# Patient Record
Sex: FEMALE | Race: WHITE | Employment: OTHER | URBAN - METROPOLITAN AREA
[De-identification: names, ages, dates, MRNs, and addresses within clinical notes are randomized per-mention and may not be internally consistent; named-entity substitution may affect disease eponyms.]

---

## 2018-04-30 PROBLEM — H81.10 BPPV (BENIGN PAROXYSMAL POSITIONAL VERTIGO): Status: ACTIVE | Noted: 2018-04-30

## 2018-05-01 ENCOUNTER — OFFICE VISIT (OUTPATIENT)
Dept: FAMILY MEDICINE CLINIC | Age: 38
End: 2018-05-01

## 2018-05-01 VITALS
DIASTOLIC BLOOD PRESSURE: 66 MMHG | WEIGHT: 123 LBS | OXYGEN SATURATION: 98 % | SYSTOLIC BLOOD PRESSURE: 90 MMHG | RESPIRATION RATE: 12 BRPM | HEART RATE: 84 BPM | HEIGHT: 65 IN | BODY MASS INDEX: 20.49 KG/M2

## 2018-05-01 DIAGNOSIS — H81.10 BENIGN PAROXYSMAL POSITIONAL VERTIGO, UNSPECIFIED LATERALITY: ICD-10-CM

## 2018-05-01 DIAGNOSIS — Z86.2 HISTORY OF ANEMIA: ICD-10-CM

## 2018-05-01 DIAGNOSIS — Z00.01 ENCOUNTER FOR GENERAL ADULT MEDICAL EXAMINATION WITH ABNORMAL FINDINGS: Primary | ICD-10-CM

## 2018-05-01 DIAGNOSIS — Z86.32 HISTORY OF GESTATIONAL DIABETES: ICD-10-CM

## 2018-05-01 DIAGNOSIS — F39 MOOD DISORDER (HCC): ICD-10-CM

## 2018-05-01 DIAGNOSIS — R11.0 NAUSEA: ICD-10-CM

## 2018-05-01 LAB
ALBUMIN SERPL-MCNC: 4.7 G/DL (ref 3.4–5)
ALP BLD-CCNC: 29 U/L (ref 40–129)
ALT SERPL-CCNC: 7 U/L (ref 10–40)
AMYLASE: 54 U/L (ref 25–115)
ANION GAP SERPL CALCULATED.3IONS-SCNC: 12 MMOL/L (ref 3–16)
AST SERPL-CCNC: 10 U/L (ref 15–37)
BASOPHILS ABSOLUTE: 0 K/UL (ref 0–0.2)
BASOPHILS RELATIVE PERCENT: 0.7 %
BILIRUB SERPL-MCNC: 0.5 MG/DL (ref 0–1)
BILIRUBIN DIRECT: <0.2 MG/DL (ref 0–0.3)
BILIRUBIN, INDIRECT: ABNORMAL MG/DL (ref 0–1)
BUN BLDV-MCNC: 12 MG/DL (ref 7–20)
CALCIUM SERPL-MCNC: 9.2 MG/DL (ref 8.3–10.6)
CHLORIDE BLD-SCNC: 104 MMOL/L (ref 99–110)
CO2: 25 MMOL/L (ref 21–32)
CREAT SERPL-MCNC: 0.6 MG/DL (ref 0.6–1.1)
EOSINOPHILS ABSOLUTE: 0 K/UL (ref 0–0.6)
EOSINOPHILS RELATIVE PERCENT: 0.6 %
GFR AFRICAN AMERICAN: >60
GFR NON-AFRICAN AMERICAN: >60
GLUCOSE BLD-MCNC: 95 MG/DL (ref 70–99)
HCT VFR BLD CALC: 36.3 % (ref 36–48)
HEMOGLOBIN: 12.1 G/DL (ref 12–16)
LYMPHOCYTES ABSOLUTE: 1.5 K/UL (ref 1–5.1)
LYMPHOCYTES RELATIVE PERCENT: 34.6 %
MCH RBC QN AUTO: 30.9 PG (ref 26–34)
MCHC RBC AUTO-ENTMCNC: 33.2 G/DL (ref 31–36)
MCV RBC AUTO: 93 FL (ref 80–100)
MONOCYTES ABSOLUTE: 0.3 K/UL (ref 0–1.3)
MONOCYTES RELATIVE PERCENT: 7.5 %
NEUTROPHILS ABSOLUTE: 2.5 K/UL (ref 1.7–7.7)
NEUTROPHILS RELATIVE PERCENT: 56.6 %
PDW BLD-RTO: 13.2 % (ref 12.4–15.4)
PLATELET # BLD: 159 K/UL (ref 135–450)
PMV BLD AUTO: 11 FL (ref 5–10.5)
POTASSIUM SERPL-SCNC: 4.1 MMOL/L (ref 3.5–5.1)
RBC # BLD: 3.9 M/UL (ref 4–5.2)
SEDIMENTATION RATE, ERYTHROCYTE: 9 MM/HR (ref 0–20)
SODIUM BLD-SCNC: 141 MMOL/L (ref 136–145)
TOTAL PROTEIN: 6.9 G/DL (ref 6.4–8.2)
TSH REFLEX FT4: 1.14 UIU/ML (ref 0.27–4.2)
WBC # BLD: 4.5 K/UL (ref 4–11)

## 2018-05-01 PROCEDURE — 99204 OFFICE O/P NEW MOD 45 MIN: CPT | Performed by: INTERNAL MEDICINE

## 2018-05-01 PROCEDURE — 36415 COLL VENOUS BLD VENIPUNCTURE: CPT | Performed by: INTERNAL MEDICINE

## 2018-05-01 RX ORDER — ALPRAZOLAM 0.25 MG/1
0.25 TABLET ORAL NIGHTLY PRN
COMMUNITY
End: 2019-05-09 | Stop reason: SDUPTHER

## 2018-05-01 RX ORDER — ESCITALOPRAM OXALATE 10 MG/1
TABLET ORAL
Qty: 10 TABLET | Refills: 0 | Status: SHIPPED | OUTPATIENT
Start: 2018-05-01 | End: 2018-05-17 | Stop reason: SDUPTHER

## 2018-05-01 ASSESSMENT — PATIENT HEALTH QUESTIONNAIRE - PHQ9
SUM OF ALL RESPONSES TO PHQ9 QUESTIONS 1 & 2: 0
2. FEELING DOWN, DEPRESSED OR HOPELESS: 0
SUM OF ALL RESPONSES TO PHQ QUESTIONS 1-9: 0
1. LITTLE INTEREST OR PLEASURE IN DOING THINGS: 0

## 2018-05-02 PROBLEM — R10.13 DYSPEPSIA: Status: ACTIVE | Noted: 2018-05-02

## 2018-05-02 PROBLEM — F41.9 ANXIETY: Status: ACTIVE | Noted: 2018-05-02

## 2018-05-02 LAB
ESTIMATED AVERAGE GLUCOSE: 108.3 MG/DL
HBA1C MFR BLD: 5.4 %

## 2018-05-17 ENCOUNTER — OFFICE VISIT (OUTPATIENT)
Dept: FAMILY MEDICINE CLINIC | Age: 38
End: 2018-05-17

## 2018-05-17 VITALS
DIASTOLIC BLOOD PRESSURE: 56 MMHG | OXYGEN SATURATION: 98 % | HEART RATE: 68 BPM | RESPIRATION RATE: 12 BRPM | WEIGHT: 121 LBS | BODY MASS INDEX: 20.16 KG/M2 | SYSTOLIC BLOOD PRESSURE: 90 MMHG | HEIGHT: 65 IN

## 2018-05-17 DIAGNOSIS — F41.9 ANXIETY: Primary | ICD-10-CM

## 2018-05-17 DIAGNOSIS — R11.0 NAUSEA: ICD-10-CM

## 2018-05-17 DIAGNOSIS — R10.13 DYSPEPSIA: ICD-10-CM

## 2018-05-17 DIAGNOSIS — H81.10 BENIGN PAROXYSMAL POSITIONAL VERTIGO, UNSPECIFIED LATERALITY: ICD-10-CM

## 2018-05-17 PROCEDURE — 99213 OFFICE O/P EST LOW 20 MIN: CPT | Performed by: INTERNAL MEDICINE

## 2018-05-17 RX ORDER — FLUTICASONE PROPIONATE 50 MCG
1 SPRAY, SUSPENSION (ML) NASAL DAILY
COMMUNITY
End: 2022-03-29

## 2018-05-17 RX ORDER — ESCITALOPRAM OXALATE 10 MG/1
TABLET ORAL
Qty: 30 TABLET | Refills: 1 | Status: SHIPPED | OUTPATIENT
Start: 2018-05-17 | End: 2018-06-21 | Stop reason: SDUPTHER

## 2018-06-21 ENCOUNTER — OFFICE VISIT (OUTPATIENT)
Dept: FAMILY MEDICINE CLINIC | Age: 38
End: 2018-06-21

## 2018-06-21 VITALS
WEIGHT: 118 LBS | DIASTOLIC BLOOD PRESSURE: 59 MMHG | OXYGEN SATURATION: 98 % | HEART RATE: 70 BPM | SYSTOLIC BLOOD PRESSURE: 92 MMHG | BODY MASS INDEX: 19.66 KG/M2 | HEIGHT: 65 IN

## 2018-06-21 DIAGNOSIS — L60.9 NAIL ABNORMALITIES: ICD-10-CM

## 2018-06-21 DIAGNOSIS — R10.13 DYSPEPSIA: ICD-10-CM

## 2018-06-21 DIAGNOSIS — H81.10 BENIGN PAROXYSMAL POSITIONAL VERTIGO, UNSPECIFIED LATERALITY: ICD-10-CM

## 2018-06-21 DIAGNOSIS — F41.9 ANXIETY: Primary | ICD-10-CM

## 2018-06-21 PROCEDURE — 99213 OFFICE O/P EST LOW 20 MIN: CPT | Performed by: INTERNAL MEDICINE

## 2018-06-21 RX ORDER — ESCITALOPRAM OXALATE 10 MG/1
TABLET ORAL
Qty: 90 TABLET | Refills: 0 | Status: SHIPPED | OUTPATIENT
Start: 2018-06-21 | End: 2018-09-20 | Stop reason: SDUPTHER

## 2018-07-26 ENCOUNTER — OFFICE VISIT (OUTPATIENT)
Dept: PSYCHOLOGY | Age: 38
End: 2018-07-26

## 2018-07-26 DIAGNOSIS — F41.1 GAD (GENERALIZED ANXIETY DISORDER): Primary | ICD-10-CM

## 2018-07-26 PROCEDURE — 90791 PSYCH DIAGNOSTIC EVALUATION: CPT | Performed by: PSYCHOLOGIST

## 2018-07-26 ASSESSMENT — ANXIETY QUESTIONNAIRES
2. NOT BEING ABLE TO STOP OR CONTROL WORRYING: 0-NOT AT ALL SURE
1. FEELING NERVOUS, ANXIOUS, OR ON EDGE: 1-SEVERAL DAYS
4. TROUBLE RELAXING: 1-SEVERAL DAYS
GAD7 TOTAL SCORE: 3
6. BECOMING EASILY ANNOYED OR IRRITABLE: 0-NOT AT ALL SURE
7. FEELING AFRAID AS IF SOMETHING AWFUL MIGHT HAPPEN: 0-NOT AT ALL SURE
3. WORRYING TOO MUCH ABOUT DIFFERENT THINGS: 0-NOT AT ALL SURE
5. BEING SO RESTLESS THAT IT IS HARD TO SIT STILL: 1-SEVERAL DAYS

## 2018-07-26 ASSESSMENT — PATIENT HEALTH QUESTIONNAIRE - PHQ9
1. LITTLE INTEREST OR PLEASURE IN DOING THINGS: 1
SUM OF ALL RESPONSES TO PHQ9 QUESTIONS 1 & 2: 2
2. FEELING DOWN, DEPRESSED OR HOPELESS: 1
SUM OF ALL RESPONSES TO PHQ QUESTIONS 1-9: 2

## 2018-07-26 NOTE — PROGRESS NOTES
 Smoking status: Never Smoker    Smokeless tobacco: Never Used    Alcohol use No    Drug use: Unknown    Sexual activity: Yes     Other Topics Concern    Not on file     Social History Narrative    No narrative on file       TOBACCO:   reports that she has never smoked. She has never used smokeless tobacco.  ETOH:   reports that she does not drink alcohol. Family History:   No family history on file. A:    See S: above. Pt presenting with chronic anxiety disorder in the ISAURA and panic spectrum. Multiple transitions:  just bought chiropractic practice, pt doing the financial books for it. Provided psycho-ed about role transition in her family playing in increased anxiety focused on panic attack management skills today. She will rehearse breathing exercises to use if another panic attack happens in the future. Rest of appt discussed the benefits of longer term therapy referral but she would like to put on hold for now. Pt has a lot of solid therapeutic lifestyle changes (TLCs)  in place in addition to medication. Would like to try TLCs a few more months before makes decision for longer term therapy referral and I am in agreement with this. I will continue to bridge services until she makes decision. Denied any si/hi risk, intent, or plan. F/u with me in 3-4 weeks. PHQ Scores 7/26/2018 5/1/2018   PHQ2 Score 2 0   PHQ9 Score 2 0     Interpretation of Total Score Depression Severity: 1-4 = Minimal depression, 5-9 = Mild depression, 10-14 = Moderate depression, 15-19 = Moderately severe depression, 20-27 = Severe depression    ISAURA 7 SCORE 7/26/2018   ISAURA-7 Total Score 3     Interpretation of ISAURA-7 score: 5-9 = mild anxiety, 10-14 = moderate anxiety, 15+ = severe anxiety. Recommend referral to behavioral health for scores 10 or greater.   Diagnosis:    ISAURA, Panic disorder       Diagnosis Date    Anxiety     BPPV (benign paroxysmal positional vertigo) 4/30/2018    GERD (gastroesophageal

## 2018-07-26 NOTE — PATIENT INSTRUCTIONS
1. Read panic attack handout: practice breathing exercises, [id number]   2. Schedule , 1 x per week, 1-2 hours   3. Continue to yoga and pilates, nearly every day 30-40 minutes  4. Continue to take Lexapro 10 mg daily  5. Think about longer term therapy down the road  6. Return to clinic for Dr. Latanya Mcqueen in 3-4 weeks             Panic Disorder Handout    What Is a Panic Attack? Sometimes we experience a sudden and severe onset of symptoms that can be scary. These symptoms can include some or all of the following:     Pounding heart or increased heart rate   Feeling dizzy, unsteady, lightheaded, or faint   Sweating   Nausea   Feelings of unreality or being detached from yourself   Trembling or shaking   Fear of losing control or going crazy   Shortness of breath   Fear of dying   Feeling of choking   Numbness or tingling   Chest pain   Chills or hot flashes    Although we dont fully understand why some people experience panic attacks and other people dont, we do know that these symptoms are related to a very normal response called the fight-flight response. This response allows our body to react quickly when we think that something is dangerous, such as being attacked or being cut off when we are driving. How Panic Attacks Affect Our Lives    Because symptoms of a panic attack occur out of the blue, we can become worried about them, and we may begin to avoid situations that we think will result in these panic symptoms, such as crowded stores, public transportation, or driving. What situations have you avoided because of panic attacks? Changing Thinking Patterns    One of the most important changes associated with decreasing panic attacks is changing how we think. The fear associated with having a panic attack may increase the likelihood of having an attack. Therefore, a willingness to experience a panic attack, knowing that the symptoms, although uncomfortable, will not harm you, is an important aspect of managing the symptoms of panic. Thinking that increases panic          Thinking that decreases panic  Im having a heart attack! This is not an emergency. Im going to die. This doesnt feel good, it wont hurt me. I cant stand this. I can feel uncomfortable and still be OK. I have to get out of here. This will go away with time. Oh no, here it comes! I can handle this. What are the things that you say to yourself that may increase panic symptoms? What could you say to decrease panic symptoms? Breathing Retraining    People who have panic attacks show some signs of hyperventilation or overbreathing. When people hyperventilate, certain blood vessels in the body become narrower, which can contribute to numbness or tingling in the hands or feet or the sensation of cold, clammy hands, and increased heart rate. You can help overcome overbreathing by learning breathing control. Instructions for Breathing Retrainin. Choose a comfortable quiet location. 2. Count, One, on the first breath in, and think, Relax, on the breath out. 3. Focus your attention on breathing and counting. 4. Maintain a normal rate and depth of breathing. 5. Expand your abdomen on the breath in and keep your chest still.   6. Continue counting up to 10 and back to 1.  7. Practice two times

## 2018-08-30 ENCOUNTER — OFFICE VISIT (OUTPATIENT)
Dept: PSYCHOLOGY | Age: 38
End: 2018-08-30

## 2018-08-30 DIAGNOSIS — F41.0 PANIC DISORDER: ICD-10-CM

## 2018-08-30 DIAGNOSIS — F41.1 GAD (GENERALIZED ANXIETY DISORDER): Primary | ICD-10-CM

## 2018-08-30 PROCEDURE — 90834 PSYTX W PT 45 MINUTES: CPT | Performed by: PSYCHOLOGIST

## 2018-08-30 ASSESSMENT — ANXIETY QUESTIONNAIRES
3. WORRYING TOO MUCH ABOUT DIFFERENT THINGS: 0-NOT AT ALL SURE
1. FEELING NERVOUS, ANXIOUS, OR ON EDGE: 1-SEVERAL DAYS
7. FEELING AFRAID AS IF SOMETHING AWFUL MIGHT HAPPEN: 1-SEVERAL DAYS
4. TROUBLE RELAXING: 0-NOT AT ALL SURE
6. BECOMING EASILY ANNOYED OR IRRITABLE: 1-SEVERAL DAYS
GAD7 TOTAL SCORE: 4
2. NOT BEING ABLE TO STOP OR CONTROL WORRYING: 1-SEVERAL DAYS
5. BEING SO RESTLESS THAT IT IS HARD TO SIT STILL: 0-NOT AT ALL SURE

## 2018-08-30 ASSESSMENT — PATIENT HEALTH QUESTIONNAIRE - PHQ9
SUM OF ALL RESPONSES TO PHQ QUESTIONS 1-9: 1
1. LITTLE INTEREST OR PLEASURE IN DOING THINGS: 1
SUM OF ALL RESPONSES TO PHQ QUESTIONS 1-9: 1
SUM OF ALL RESPONSES TO PHQ9 QUESTIONS 1 & 2: 1
2. FEELING DOWN, DEPRESSED OR HOPELESS: 0

## 2018-08-30 NOTE — PROGRESS NOTES
depression, 20-27 = Severe depression    ISAURA 7 SCORE 8/30/2018 7/26/2018   ISAURA-7 Total Score 4 3     Interpretation of ISAURA-7 score: 5-9 = mild anxiety, 10-14 = moderate anxiety, 15+ = severe anxiety. Recommend referral to behavioral health for scores 10 or greater. Diagnosis:    ISAURA, Panic disorder       Diagnosis Date    Anxiety     BPPV (benign paroxysmal positional vertigo) 4/30/2018    GERD (gastroesophageal reflux disease)     Gestational diabetes      Problems related to the social environment    Plan:  Pt interventions:    Practiced assertive communication, Trained in strategies for increasing balanced thinking, Discussed self-care (sleep, nutrition, rewarding activities, social support, exercise), Motivational Interviewing to increase patient confidence and compliance with adhering to behavioral change plan, Discussed potential barriers to change and Supportive techniques    Pt Behavioral Change Plan:    1. Schedule time for self 1 x per week, ask mother to care for children  2. Ask , Adiel Nelson to take children Thursday mornings for down time, 2 hours to do something for yourself  3. Continue to use breathing exercises to control panic  4.  Return to clinic for Dr. Serg Mendoza as needed

## 2018-09-20 ENCOUNTER — OFFICE VISIT (OUTPATIENT)
Dept: FAMILY MEDICINE CLINIC | Age: 38
End: 2018-09-20

## 2018-09-20 VITALS
DIASTOLIC BLOOD PRESSURE: 60 MMHG | BODY MASS INDEX: 19.66 KG/M2 | WEIGHT: 118 LBS | RESPIRATION RATE: 12 BRPM | OXYGEN SATURATION: 98 % | HEIGHT: 65 IN | SYSTOLIC BLOOD PRESSURE: 96 MMHG | HEART RATE: 71 BPM

## 2018-09-20 DIAGNOSIS — F41.9 ANXIETY: Primary | ICD-10-CM

## 2018-09-20 DIAGNOSIS — H81.10 BENIGN PAROXYSMAL POSITIONAL VERTIGO, UNSPECIFIED LATERALITY: ICD-10-CM

## 2018-09-20 DIAGNOSIS — L60.9 NAIL ABNORMALITY: ICD-10-CM

## 2018-09-20 PROCEDURE — 99213 OFFICE O/P EST LOW 20 MIN: CPT | Performed by: INTERNAL MEDICINE

## 2018-09-20 RX ORDER — ESCITALOPRAM OXALATE 20 MG/1
TABLET ORAL
Qty: 90 TABLET | Refills: 0 | Status: SHIPPED | OUTPATIENT
Start: 2018-09-20 | End: 2018-10-18 | Stop reason: SDUPTHER

## 2018-09-20 NOTE — PROGRESS NOTES
HPI: Braden Hyatt presents for follow up of anxiety    History of anxiety as a child. Therapy in college. Noted on Lexapro with good response 2018. Has had some follow-up with psychology however financial recent restrictions. Increased stressors at home. Some difficulty sleeping. Decreased appetite. No chest pain or panic attack since July. Not dancing now secondary to daughter having dance at the same time. Is taking a couple of hours off a week while her mother watches the children is able to do journaling. Is doing some crafts. No recreational drugs. Family issues with her . Starting a new business. Increased stressors. Not looking forward to winter. Has to interact more in  and is having some increased anxiety depression. No suicidal thought. Dyspepsia improved. Normal laboratories.        no abnormal pap. GYN not under plans. Pap 2018. .      BPPV. MRI negative. Occur every 2 months. Has home exercises which she does to resolve this. This is stable.     PMH     x 2 gestational diabetes.     MEDS: pnv Lexapro Penlac, Flonase,     SH:  2 children. 7 yo with anxiety. No tobacco. No alcohol. No drugs. No domestic violence. 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     FH: 4 siblings    - breast ovarian, colon cancer, DM     + alcoholism, anxiety and depression.      ROS: Up-to-date eye exams. No hearing loss. Recurrent vertigo. Dyspepsia. History of bronchitis remotely. Occasional ventricular ectopy. Had Holter and EKG with echocardiogram without abnormality. Not as much of an issue currently. Denies vomiting. Positive constipation during her menses. No recurrent bladder infections. No joint complaints.  No skin concerns other than dry hands        Constitutional, ent, CV, respiratory, GI, , joint, skin, allergic and psychiatric ROS negative except for above    No Known Allergies    Outpatient Prescriptions Marked as Taking for the 18 encounter (Office Date/Time     05/01/2018 1109    K 4.1 05/01/2018 1109     05/01/2018 1109    CO2 25 05/01/2018 1109    BUN 12 05/01/2018 1109    CREATININE 0.6 05/01/2018 1109        Component Value Date/Time    CALCIUM 9.2 05/01/2018 1109    ALKPHOS 29 (L) 05/01/2018 1109    AST 10 (L) 05/01/2018 1109    ALT 7 (L) 05/01/2018 1109    BILITOT 0.5 05/01/2018 1109            Lab Results   Component Value Date    WBC 4.5 05/01/2018    HGB 12.1 05/01/2018    HCT 36.3 05/01/2018    MCV 93.0 05/01/2018     05/01/2018     Lab Results   Component Value Date    LABA1C 5.4 05/01/2018     Lab Results   Component Value Date    .3 05/01/2018     Lab Results   Component Value Date    LABA1C 5.4 05/01/2018     No components found for: CHLPL  No results found for: TRIG  No results found for: HDL  No results found for: LDLCALC  No results found for: LABVLDL    Old labs and records reviewed or requested  Discussed past lab and studies with patient      Diagnosis Orders   1. Anxiety     2. Benign paroxysmal positional vertigo, unspecified laterality     3. Nail abnormality       Anxiety and depression worsening with financial stressors, new business and 's family issues. Will increase Lexapro to 20 mg. Follow back up in 6 weeks. BP PV. Stable. Continue on with her exercise    Disc more thick nail. Continue on with her lack or      Return in about 5 weeks (around 10/25/2018), or Dr May Brown or me. Let her know if she wanted to follow-up with Dr. Rakel Toribio we will talk with each other and she could skip appointment with me and perhaps this would be financially feasible for her        Diagnosis and treatment discussed.   Possible side effects of medication reviewed  Patients questions answered  Follow up understood  Pt aware if they are not contacted about any test results , this does not mean they are normal.  They should call

## 2018-10-05 RX ORDER — ESCITALOPRAM OXALATE 10 MG/1
TABLET ORAL
Qty: 90 TABLET | Refills: 0 | OUTPATIENT
Start: 2018-10-05

## 2018-10-18 ENCOUNTER — OFFICE VISIT (OUTPATIENT)
Dept: FAMILY MEDICINE CLINIC | Age: 38
End: 2018-10-18
Payer: COMMERCIAL

## 2018-10-18 VITALS
HEART RATE: 69 BPM | DIASTOLIC BLOOD PRESSURE: 62 MMHG | WEIGHT: 121 LBS | HEIGHT: 65 IN | OXYGEN SATURATION: 97 % | RESPIRATION RATE: 12 BRPM | SYSTOLIC BLOOD PRESSURE: 90 MMHG | BODY MASS INDEX: 20.16 KG/M2

## 2018-10-18 DIAGNOSIS — F41.9 ANXIETY: Primary | ICD-10-CM

## 2018-10-18 PROCEDURE — 99201 PR OFFICE OUTPATIENT NEW 10 MINUTES: CPT | Performed by: INTERNAL MEDICINE

## 2018-10-18 RX ORDER — ESCITALOPRAM OXALATE 20 MG/1
TABLET ORAL
Qty: 90 TABLET | Refills: 0 | Status: SHIPPED | OUTPATIENT
Start: 2018-10-18 | End: 2019-01-17 | Stop reason: SDUPTHER

## 2019-01-17 RX ORDER — ESCITALOPRAM OXALATE 20 MG/1
TABLET ORAL
Qty: 90 TABLET | Refills: 0 | Status: SHIPPED | OUTPATIENT
Start: 2019-01-17 | End: 2019-04-29 | Stop reason: SDUPTHER

## 2019-05-09 ENCOUNTER — OFFICE VISIT (OUTPATIENT)
Dept: FAMILY MEDICINE CLINIC | Age: 39
End: 2019-05-09
Payer: COMMERCIAL

## 2019-05-09 VITALS
DIASTOLIC BLOOD PRESSURE: 63 MMHG | BODY MASS INDEX: 20.99 KG/M2 | RESPIRATION RATE: 12 BRPM | OXYGEN SATURATION: 97 % | SYSTOLIC BLOOD PRESSURE: 95 MMHG | HEART RATE: 72 BPM | HEIGHT: 65 IN | WEIGHT: 126 LBS

## 2019-05-09 DIAGNOSIS — F41.9 ANXIETY: Primary | ICD-10-CM

## 2019-05-09 DIAGNOSIS — J30.2 SEASONAL ALLERGIC RHINITIS, UNSPECIFIED TRIGGER: ICD-10-CM

## 2019-05-09 DIAGNOSIS — H81.10 BENIGN PAROXYSMAL POSITIONAL VERTIGO, UNSPECIFIED LATERALITY: ICD-10-CM

## 2019-05-09 PROCEDURE — G8427 DOCREV CUR MEDS BY ELIG CLIN: HCPCS | Performed by: INTERNAL MEDICINE

## 2019-05-09 PROCEDURE — 99213 OFFICE O/P EST LOW 20 MIN: CPT | Performed by: INTERNAL MEDICINE

## 2019-05-09 PROCEDURE — 1036F TOBACCO NON-USER: CPT | Performed by: INTERNAL MEDICINE

## 2019-05-09 PROCEDURE — G8420 CALC BMI NORM PARAMETERS: HCPCS | Performed by: INTERNAL MEDICINE

## 2019-05-09 RX ORDER — ESCITALOPRAM OXALATE 20 MG/1
TABLET ORAL
Qty: 90 TABLET | Refills: 1 | Status: SHIPPED | OUTPATIENT
Start: 2019-05-09 | End: 2020-01-22 | Stop reason: SDUPTHER

## 2019-05-09 RX ORDER — ALPRAZOLAM 0.25 MG/1
TABLET ORAL
Qty: 10 TABLET | Refills: 0 | Status: SHIPPED | OUTPATIENT
Start: 2019-05-09 | End: 2020-05-09

## 2019-05-09 ASSESSMENT — PATIENT HEALTH QUESTIONNAIRE - PHQ9
SUM OF ALL RESPONSES TO PHQ QUESTIONS 1-9: 0
SUM OF ALL RESPONSES TO PHQ9 QUESTIONS 1 & 2: 0
SUM OF ALL RESPONSES TO PHQ QUESTIONS 1-9: 0
1. LITTLE INTEREST OR PLEASURE IN DOING THINGS: 0
2. FEELING DOWN, DEPRESSED OR HOPELESS: 0

## 2019-05-09 NOTE — PROGRESS NOTES
HPI: Kristie Pepe presents for follow up Lexapro. Chronic health issues include dyspepsia, BPPV, BMI 20. Anxiety. Long anxiety. College therapy. Multiple stressors. Doing well with Lexapro daily. Tried to cut back to 10 however became anxious again. Has anxiety with travel and requests Xanax prescription for the year. Continues have some episodes of feeling overwhelmed with not much. He gets 2 hours off week. No recreational drugs. Is doing yoga. Sleeping at night. Pleased with how she is doing. Weight is up. BMI down just over 20      No current dyspepsia. Gestational diabetes with A1c of 5.1 last year. No polyuria. Homero Comings   no abnormal pap. GYN not under plan. Pap 2018. .     Dysmorphic. Nail improved no longer using topicals. Positive allergies. Uses Flonase successfully. .Zyrtec. No wheeze. No current eye symptoms. Symptoms currently controlled     BPPV. MRI negative. Occur every 2 months. Has home exercises which she does to resolve this. This is stable.     PMH     x 2 gestational diabetes.     MEDS: pnv Lexapro , Flonase, Zyrtec when necessary.     SH:  2 children. 8 yo with anxiety. No tobacco. No alcohol. No drugs. No domestic violence. 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     FH: 4 siblings    - breast ovarian, colon cancer, DM     + alcoholism, anxiety and depression.      ROS: Up-to-date eye exams. No hearing loss. Recurrent vertigo. Dyspepsia resolved. Hershell Broach History of bronchitis remotely. Is of rhinitis. Seasonal allergies doing well with Flonase. Occasional ventricular ectopy. Had Holter and EKG with echocardiogram without abnormality. Not as much of an issue currently. Denies vomiting. Positive constipation during her menses. No recurrent bladder infections. No joint complaints.  No skin concerns other than dry hands       Constitutional, ent, CV, respiratory, GI, , joint, skin, allergic and psychiatric ROS reviewed and negative except for above    No Known Allergies    Outpatient Medications Marked as Taking for the 5/9/19 encounter (Office Visit) with Steph Haley MD   Medication Sig Dispense Refill    escitalopram (LEXAPRO) 20 MG tablet 1 po q day 90 tablet 0    fluticasone (FLONASE) 50 MCG/ACT nasal spray 1 spray by Nasal route daily      Prenatal Vit-Fe Fumarate-FA (PRENATAL PO) Take by mouth               Past Medical History:   Diagnosis Date    Anxiety     BPPV (benign paroxysmal positional vertigo) 4/30/2018    GERD (gastroesophageal reflux disease)     Gestational diabetes        Past Surgical History:   Procedure Laterality Date    WISDOM TOOTH EXTRACTION               Family History   Problem Relation Age of Onset    Alcohol Abuse Other         anxiety depression    Colon Cancer Neg Hx         breast nor ovarian cancer           Review of Systems        Objective     BP 95/63   Pulse 72   Resp 12   Ht 5' 5\" (1.651 m)   Wt 126 lb (57.2 kg)   SpO2 97% Comment: RA  BMI 20.97 kg/m²     @LASTSAO2(3)@    Wt Readings from Last 3 Encounters:   05/09/19 126 lb (57.2 kg)   10/18/18 121 lb (54.9 kg)   09/20/18 118 lb (53.5 kg)       Physical Exam     NAD alert and cooperative  HEENT: No thyromegaly or adenopathy. No proptosis. Good dentition. Most mucous membranes. No nasal salute  Lungs are clear. Good to be ratio without any wheezes rales or rhonchi. Cardio vascular exam regular rate and rhythm without any murmur click. Abdomen benign no hepatosplenomegaly. No crepitus of the knees. No cyanosis clubbing or edema.     Chemistry        Component Value Date/Time     05/01/2018 1109    K 4.1 05/01/2018 1109     05/01/2018 1109    CO2 25 05/01/2018 1109    BUN 12 05/01/2018 1109    CREATININE 0.6 05/01/2018 1109        Component Value Date/Time    CALCIUM 9.2 05/01/2018 1109    ALKPHOS 29 (L) 05/01/2018 1109    AST 10 (L) 05/01/2018 1109    ALT 7 (L) 05/01/2018 1109    BILITOT 0.5 05/01/2018 1109

## 2019-05-09 NOTE — PATIENT INSTRUCTIONS

## 2019-09-12 ENCOUNTER — OFFICE VISIT (OUTPATIENT)
Dept: FAMILY MEDICINE CLINIC | Age: 39
End: 2019-09-12
Payer: COMMERCIAL

## 2019-09-12 VITALS
SYSTOLIC BLOOD PRESSURE: 92 MMHG | HEART RATE: 94 BPM | HEIGHT: 65 IN | WEIGHT: 127 LBS | BODY MASS INDEX: 21.16 KG/M2 | DIASTOLIC BLOOD PRESSURE: 63 MMHG | OXYGEN SATURATION: 97 % | RESPIRATION RATE: 12 BRPM

## 2019-09-12 DIAGNOSIS — B35.4 RINGWORM OF BODY: Primary | ICD-10-CM

## 2019-09-12 DIAGNOSIS — F41.9 ANXIETY: ICD-10-CM

## 2019-09-12 DIAGNOSIS — J06.9 VIRAL URI: ICD-10-CM

## 2019-09-12 PROCEDURE — 1036F TOBACCO NON-USER: CPT | Performed by: INTERNAL MEDICINE

## 2019-09-12 PROCEDURE — 99213 OFFICE O/P EST LOW 20 MIN: CPT | Performed by: INTERNAL MEDICINE

## 2019-09-12 PROCEDURE — G8427 DOCREV CUR MEDS BY ELIG CLIN: HCPCS | Performed by: INTERNAL MEDICINE

## 2019-09-12 PROCEDURE — G8420 CALC BMI NORM PARAMETERS: HCPCS | Performed by: INTERNAL MEDICINE

## 2019-09-12 RX ORDER — PRENATAL VIT 91/IRON/FOLIC/DHA 28-975-200
COMBINATION PACKAGE (EA) ORAL
Qty: 42 G | Refills: 0 | Status: SHIPPED | OUTPATIENT
Start: 2019-09-12 | End: 2022-09-07

## 2019-09-12 RX ORDER — OXYMETAZOLINE HYDROCHLORIDE 0.05 G/100ML
SPRAY NASAL
Qty: 1 BOTTLE | Refills: 3 | Status: SHIPPED | OUTPATIENT
Start: 2019-09-12 | End: 2022-03-29

## 2019-09-12 NOTE — PROGRESS NOTES
an issue currently. Denies vomiting. Positive constipation during her menses. No recurrent bladder infections. No joint complaints. No skin concerns other than dry hands        Constitutional, ent, CV, respiratory, GI, , joint, skin, allergic and psychiatric ROS reviewed and negative except for above    No Known Allergies    Outpatient Medications Marked as Taking for the 9/12/19 encounter (Office Visit) with Pamela David MD   Medication Sig Dispense Refill    escitalopram (LEXAPRO) 20 MG tablet 1 po q day 90 tablet 1    fluticasone (FLONASE) 50 MCG/ACT nasal spray 1 spray by Nasal route daily      Prenatal Vit-Fe Fumarate-FA (PRENATAL PO) Take by mouth               Past Medical History:   Diagnosis Date    Anxiety     BPPV (benign paroxysmal positional vertigo) 4/30/2018    GERD (gastroesophageal reflux disease)     Gestational diabetes     Seasonal allergic rhinitis 5/9/2019       Past Surgical History:   Procedure Laterality Date    WISDOM TOOTH EXTRACTION               Family History   Problem Relation Age of Onset    Alcohol Abuse Other         anxiety depression    Colon Cancer Neg Hx         breast nor ovarian cancer           Review of Systems            Objective     BP 92/63   Pulse 94   Resp 12   Ht 5' 5\" (1.651 m)   Wt 127 lb (57.6 kg)   SpO2 97% Comment: RA  BMI 21.13 kg/m²   99.1    @LASTSAO2(3)@    Wt Readings from Last 3 Encounters:   09/12/19 127 lb (57.6 kg)   05/09/19 126 lb (57.2 kg)   10/18/18 121 lb (54.9 kg)       Physical Exam     NAD alert and cooperative  HEENT: Serous otitis bilaterally. Mildly erythematous pharynx. No anterior cervical adenopathy. Clearly  rhinorrhea. Lungs are clear. Good CINTHIA ratio. No wheezes rales or rhonchi. Cardiovascular exam regular rate and rhythm. No ectopy. Abdomen is benign. No cyanosis clubbing or edema. Round scaling lesion three-quarter centimeter back with regular margins. No pustules or ulceration.       Chemistry

## 2019-09-12 NOTE — PATIENT INSTRUCTIONS
Patient Education        Ringworm: Care Instructions  Your Care Instructions  Ringworm is a fungus infection of the skin. It is not caused by a worm. Ringworm causes a round, scaly rash that may crack and itch. The rash can spread over a wide area. One type of fungus that causes ringworm is often found in locker rooms and swimming pools. It grows well in warm, moist areas of the skin, such as in skin folds. You can get ringworm by sharing towels, clothing, and sports equipment. You can also get it by touching someone who has ringworm. Ringworm is treated with cream that kills the fungus. If the rash is widespread, you may need pills to get rid of it. Ringworm often comes back after treatment. If the rash becomes infected with bacteria, you may need antibiotics. Follow-up care is a key part of your treatment and safety. Be sure to make and go to all appointments, and call your doctor if you are having problems. It's also a good idea to know your test results and keep a list of the medicines you take. How can you care for yourself at home? · Take your medicines exactly as prescribed. Call your doctor if you have any problems with your medicine. · Wash the rash with soap and water, remove flaky skin, and dry thoroughly. · Try an over-the-counter cream with clotrimazole or miconazole in it. Brand names include Lotrimin, Micatin, and Tinactin. Terbinafine cream (Lamisil) is also available without a prescription. Spread the cream beyond the edge or border of the rash. Follow the directions on the package. Do not stop using the medicine just because your skin clears up. You will probably need to continue treatment for 2 to 4 weeks. · To keep from getting another infection:  ? Do not go barefoot in public places such as gyms or locker rooms. Avoid sharing towels and clothes. Use flip-flops or some other type of shoe in the shower.   ? Do not wear tight clothes or let your skin stay damp for long periods, such as by treatment and safety. Be sure to make and go to all appointments, and call your doctor if you are having problems. It's also a good idea to know your test results and keep a list of the medicines you take. How can you care for yourself at home? · Rest as much as possible until you feel better. · Be safe with medicines. Take your medicine exactly as prescribed. Call your doctor if you think you are having a problem with your medicine. You will get more details on the specific medicine your doctor prescribes. · Take an over-the-counter pain medicine, such as acetaminophen (Tylenol), ibuprofen (Advil, Motrin), or naproxen (Aleve), as needed for pain and fever. Read and follow all instructions on the label. Do not give aspirin to anyone younger than 20. It has been linked to Reye syndrome, a serious illness. · Drink plenty of fluids, enough so that your urine is light yellow or clear like water. Hot fluids, such as tea or soup, may help relieve congestion in your nose and throat. If you have kidney, heart, or liver disease and have to limit fluids, talk with your doctor before you increase the amount of fluids you drink. · Try to clear mucus from your lungs by breathing deeply and coughing. · Gargle with warm salt water once an hour. This can help reduce swelling and throat pain. Use 1 teaspoon of salt mixed in 1 cup of warm water. · Do not smoke or allow others to smoke around you. If you need help quitting, talk to your doctor about stop-smoking programs and medicines. These can increase your chances of quitting for good. To avoid spreading the virus  · Cough or sneeze into a tissue. Then throw the tissue away. · If you don't have a tissue, use your hand to cover your cough or sneeze. Then clean your hand. You can also cough into your sleeve. · Wash your hands often. Use soap and warm water. Wash for 15 to 20 seconds each time.   · If you don't have soap and water near you, you can clean your hands with

## 2020-01-22 RX ORDER — ESCITALOPRAM OXALATE 20 MG/1
TABLET ORAL
Qty: 90 TABLET | Refills: 0 | Status: SHIPPED | OUTPATIENT
Start: 2020-01-22 | End: 2020-03-05 | Stop reason: SDUPTHER

## 2020-03-05 ENCOUNTER — OFFICE VISIT (OUTPATIENT)
Dept: FAMILY MEDICINE CLINIC | Age: 40
End: 2020-03-05
Payer: COMMERCIAL

## 2020-03-05 VITALS
DIASTOLIC BLOOD PRESSURE: 64 MMHG | OXYGEN SATURATION: 97 % | WEIGHT: 133 LBS | BODY MASS INDEX: 22.16 KG/M2 | RESPIRATION RATE: 12 BRPM | SYSTOLIC BLOOD PRESSURE: 93 MMHG | HEIGHT: 65 IN | HEART RATE: 66 BPM

## 2020-03-05 PROBLEM — Z80.3 FH: BREAST CANCER: Status: ACTIVE | Noted: 2020-03-05

## 2020-03-05 PROBLEM — D22.9 ATYPICAL NEVUS: Status: ACTIVE | Noted: 2020-03-05

## 2020-03-05 PROCEDURE — 1036F TOBACCO NON-USER: CPT | Performed by: INTERNAL MEDICINE

## 2020-03-05 PROCEDURE — 99213 OFFICE O/P EST LOW 20 MIN: CPT | Performed by: INTERNAL MEDICINE

## 2020-03-05 PROCEDURE — G8420 CALC BMI NORM PARAMETERS: HCPCS | Performed by: INTERNAL MEDICINE

## 2020-03-05 PROCEDURE — G8427 DOCREV CUR MEDS BY ELIG CLIN: HCPCS | Performed by: INTERNAL MEDICINE

## 2020-03-05 PROCEDURE — G8484 FLU IMMUNIZE NO ADMIN: HCPCS | Performed by: INTERNAL MEDICINE

## 2020-03-05 RX ORDER — ESCITALOPRAM OXALATE 20 MG/1
TABLET ORAL
Qty: 90 TABLET | Refills: 1 | Status: SHIPPED | OUTPATIENT
Start: 2020-03-05 | End: 2020-09-17

## 2020-03-05 ASSESSMENT — PATIENT HEALTH QUESTIONNAIRE - PHQ9
SUM OF ALL RESPONSES TO PHQ QUESTIONS 1-9: 0
SUM OF ALL RESPONSES TO PHQ9 QUESTIONS 1 & 2: 0
2. FEELING DOWN, DEPRESSED OR HOPELESS: 0
SUM OF ALL RESPONSES TO PHQ QUESTIONS 1-9: 0
1. LITTLE INTEREST OR PLEASURE IN DOING THINGS: 0

## 2020-03-05 NOTE — PROGRESS NOTES
HPI: Kimberly Camacho presents for follow up lexapro       Chronic health issues include dyspepsia, BPPV, BMI 20. Anxiety.      Macule left posterior back of concern. Used antifungal without improvement. Present for months. Is not worsening however did not resolve. Intermittent flushing starting in summer. Usually occurs 1 week prior to cycle. About menopause. Not interested in laboratories. Generalized anxiety improved with Lexapro 20. Wishes to continue. Daughter diagnosed with obsessive-compulsive disorder. Homeschooled. Able to sleep at night. Eating. No longer doing her yoga or meditation. Does not want to make changes on medication feels she is doing relatively well. No panic attacks.       No current dyspepsia. Gestational diabetes with A1c of 5.1 2018      no abnormal pap. Pap 2018. .       BPPV. MRI negative. Occur every 2 months. Has home exercises which she does to resolve this. This is stable. Current symptoms.     PMH     x 2 gestational diabetes.     MEDS: pnv Lexapro , Flonase, Zyrtec when necessary.     SH:  2 children. 9 yo ocd . No tobacco. No alcohol. No drugs. No domestic violence. 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     FH: 4 siblings    - breast ovarian, colon cancer, DM     + alcoholism, anxiety and depression, sister 48 with breast cancer. Daughter with ocd. dad prostate cancer .      ROS: Up-to-date eye exams. No hearing loss. Recurrent vertigo. Dyspepsia resolved. Aneita Luci History of bronchitis remotely. + rhinitis. Seasonal allergies doing well with Flonase. Occasional ventricular ectopy. Had Holter and EKG with echocardiogram without abnormality. Not as much of an issue currently. Denies vomiting. Positive constipation during her menses. No recurrent bladder infections. No joint complaints.  No skin concerns other than dry hands       Constitutional, ent, CV, respiratory, GI, , joint, skin, allergic and psychiatric ROS reviewed and negative except for above    No Known Allergies    Outpatient Medications Marked as Taking for the 3/5/20 encounter (Office Visit) with Ivonne Murrell MD   Medication Sig Dispense Refill    escitalopram (LEXAPRO) 20 MG tablet 1 po q day 90 tablet 1    fluticasone (FLONASE) 50 MCG/ACT nasal spray 1 spray by Nasal route daily               Past Medical History:   Diagnosis Date    Anxiety     BPPV (benign paroxysmal positional vertigo) 4/30/2018    GERD (gastroesophageal reflux disease)     Gestational diabetes     Seasonal allergic rhinitis 5/9/2019       Past Surgical History:   Procedure Laterality Date    WISDOM TOOTH EXTRACTION               Family History   Problem Relation Age of Onset    Alcohol Abuse Other         anxiety depression    Colon Cancer Neg Hx         breast nor ovarian cancer               Objective     BP 93/64   Pulse 66   Resp 12   Ht 5' 5\" (1.651 m)   Wt 133 lb (60.3 kg)   SpO2 97% Comment: RA  BMI 22.13 kg/m²     @LASTSAO2(3)@    Wt Readings from Last 3 Encounters:   09/12/19 127 lb (57.6 kg)   05/09/19 126 lb (57.2 kg)   10/18/18 121 lb (54.9 kg)       Physical Exam     NAD alert and cooperative  HEENT: Ems unremarkable. No proptosis. No adenopathy. No stridor. Lungs are clear. Good CINTHIA ratio without any wheezes rales or rhonchi. Cardiovascular exam regular rate and rhythm no murmur click. Abdomen is benign no hepatosplenomegaly or epigastric tenderness. No cyanosis clubbing or edema.   Dime sized scaling lesion posterior back with irregular borders which was scanned into media        Chemistry        Component Value Date/Time     05/01/2018 1109    K 4.1 05/01/2018 1109     05/01/2018 1109    CO2 25 05/01/2018 1109    BUN 12 05/01/2018 1109    CREATININE 0.6 05/01/2018 1109        Component Value Date/Time    CALCIUM 9.2 05/01/2018 1109    ALKPHOS 29 (L) 05/01/2018 1109    AST 10 (L) 05/01/2018 1109    ALT 7 (L) 05/01/2018

## 2020-03-05 NOTE — PATIENT INSTRUCTIONS
Make sure to use a broad-spectrum sunscreen that has a sun protection factor (SPF) of 30 or higher. Use it every day, even when it is cloudy. · Wear a wide-brimmed hat and long sleeves and pants if you are going to be outdoors for very long. · Avoid the sun between 10 a.m. and 4 p.m., which is the peak time for the sun's ultraviolet rays. · Avoid sunburns, tanning booths, and sunlamps. · Be sure to protect children from the sun. Sunburns in childhood damage the skin and increase the risk of cancer. When should you call for help? Watch closely for changes in your health, and be sure to contact your doctor if:    · A mole looks different than it did before. It may have changed in size, color, shape, or the way it looks. Where can you learn more? Go to [email or web address].[email or web address]. org and sign in to your expresscoin account. Enter A544 in the [employer] box to learn more about \"Moles: Care Instructions. \"     If you do not have an account, please click on the \"Sign Up Now\" link. Current as of: April 1, 2019  Content Version: 12.3  © 9466-6166 Access Mobile. Care instructions adapted under license by [facility] ([facility]). If you have questions about a medical condition or this instruction, always ask your healthcare professional. [place] [address] any warranty or liability for your use of this information. Patient Education        Learning About Mindfulness for Stress  What are mindfulness and stress? Stress is what you feel when you have to handle more than you are used to. A lot of things can cause stress. You may feel stress when you go on a job interview, take a test, or run a race. This kind of short-term stress is normal and even useful. It can help you if you need to work hard or react quickly. Stress also can last a long time. Long-term stress is caused by stressful situations or events.  Examples of long-term stress include long-term health problems, ongoing problems at work, and conflicts in your family. Long-term stress can harm your health. Mindfulness is a focus only on things happening in the present moment. It's a process of purposefully paying attention to and being aware of your surroundings, your emotions, your thoughts, and how your body feels. You are aware of these things, but you aren't judging these experiences as \"good\" or \"bad. \" Mindfulness can help you learn to calm your mind and body to help you cope with illness, pain, and stress. How does mindfulness help to relieve stress? Mindfulness can help quiet your mind and relax your body. Studies show that it can help some people sleep better, feel less anxious, and bring their blood pressure down. And it's been shown to help some people live and cope better with certain health problems like heart disease, depression, chronic pain, and cancer. How do you practice mindfulness? To be mindful is to pay attention, to be present, and to be accepting. · When you're mindful, you do just one thing and you pay close attention to that one thing. For example, you may sit quietly and notice your emotions or how your food tastes and smells. · When you're present, you focus on the things that are happening right now. You let go of your thoughts about the past and the future. When you dwell on the past or the future, you miss moments that can heal and strengthen you. You may miss moments like hearing a child laugh or seeing a friendly face when you think you're all alone. · When you're accepting, you don't  the present moment. Instead you accept your thoughts and feelings as they come. You can practice anytime, anywhere, and in any way you choose. You can practice in many ways. Here are a few ideas:  · While doing your chores, like washing the dishes, let your mind focus on what's in your hand. What does the dish feel like? Is the water warm or cold? · Go outside and take a few deep breaths.  What is

## 2020-03-10 NOTE — PATIENT INSTRUCTIONS
1. Schedule time for self 1 x per week, ask mother to care for children  2. Ask , Sharron Harrison to take children Thursday mornings for down time, 2 hours to do something for yourself  3. Continue to use breathing exercises to control panic  4.  Return to clinic for Dr. Saritha Garcia as needed 0

## 2020-09-17 RX ORDER — ESCITALOPRAM OXALATE 20 MG/1
TABLET ORAL
Qty: 90 TABLET | Refills: 1 | Status: SHIPPED | OUTPATIENT
Start: 2020-09-17 | End: 2020-10-29 | Stop reason: SDUPTHER

## 2020-10-29 ENCOUNTER — VIRTUAL VISIT (OUTPATIENT)
Dept: FAMILY MEDICINE CLINIC | Age: 40
End: 2020-10-29
Payer: COMMERCIAL

## 2020-10-29 PROBLEM — R10.13 DYSPEPSIA: Status: RESOLVED | Noted: 2018-05-02 | Resolved: 2020-10-29

## 2020-10-29 PROBLEM — C44.91 BCC (BASAL CELL CARCINOMA OF SKIN): Status: ACTIVE | Noted: 2020-03-05

## 2020-10-29 PROBLEM — Z85.828 HX OF SKIN CANCER, BASAL CELL: Status: ACTIVE | Noted: 2020-10-29

## 2020-10-29 PROCEDURE — 99213 OFFICE O/P EST LOW 20 MIN: CPT | Performed by: INTERNAL MEDICINE

## 2020-10-29 PROCEDURE — G8427 DOCREV CUR MEDS BY ELIG CLIN: HCPCS | Performed by: INTERNAL MEDICINE

## 2020-10-29 RX ORDER — ESCITALOPRAM OXALATE 20 MG/1
TABLET ORAL
Qty: 90 TABLET | Refills: 1 | Status: SHIPPED | OUTPATIENT
Start: 2020-10-29 | End: 2021-09-08

## 2020-10-29 NOTE — PROGRESS NOTES
medication is doing well. Is not interested in changes. Not interested in medicine for sleep at this time. Supportive . Occasional wine only 1 glass and not nightly.       No current dyspepsia. Gestational diabetes with A1c of 5.1 2018      no abnormal pap. Pap 2018. .       BPPV. MRI negative. No issues currently. Is aware of exercises. History of rhinitis. Has used Zyrtec and Flonase in the past.  Not using currently is doing well. Is aware when to start them if needed    PMH     x 2 gestational diabetes.     MEDS: pnv Lexapro ,    SH:  2 children. 1 with anxiety and ocd . No tobacco.  Rare glass of wine. No drugs. No domestic violence. 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     FH: 4 siblings    - breast ovarian, colon cancer, DM     + alcoholism, anxiety and depression, sister 48 with breast cancer. Daughter with ocd. dad prostate cancer .      ROS: Up-to-date eye exams. No hearing loss. Recurrent vertigo. Dyspepsia resolved. Heddy  History of bronchitis remotely. + rhinitis. Seasonal allergies doing well as needed Zyrtec and Flonase. Stable  ventricular ectopy. Had Holter and EKG with echocardiogram without abnormality. Heddy  Positive constipation during her menses. No recurrent bladder infections. No joint complaints.   Skin basal cell carcinomaConstitutional, ent, CV, respiratory, GI, , joint, skin, allergic and psychiatric ROS reviewed and negative except for above    No Known Allergies    Outpatient Medications Marked as Taking for the 10/29/20 encounter (Virtual Visit) with Orly Ashby MD   Medication Sig Dispense Refill    escitalopram (LEXAPRO) 20 MG tablet TAKE 1 TABLET BY MOUTH EVERY DAY 90 tablet 1    Prenatal Vit-Fe Fumarate-FA (PRENATAL PO) Take by mouth               Past Medical History:   Diagnosis Date    Anxiety     Atypical nevus 3/5/2020    3.2020 a3d4 Daughter dxd OCD/homeschool  BPPV (benign paroxysmal positional vertigo) 4/30/2018    FH: breast cancer 3/5/2020    GERD (gastroesophageal reflux disease)     Gestational diabetes     Seasonal allergic rhinitis 5/9/2019       Past Surgical History:   Procedure Laterality Date    WISDOM TOOTH EXTRACTION               Family History   Problem Relation Age of Onset    Alcohol Abuse Other         anxiety depression    Colon Cancer Neg Hx         breast nor ovarian cancer         Review of Systems      Chemistry        Component Value Date/Time     05/01/2018 1109    K 4.1 05/01/2018 1109     05/01/2018 1109    CO2 25 05/01/2018 1109    BUN 12 05/01/2018 1109    CREATININE 0.6 05/01/2018 1109        Component Value Date/Time    CALCIUM 9.2 05/01/2018 1109    ALKPHOS 29 (L) 05/01/2018 1109    AST 10 (L) 05/01/2018 1109    ALT 7 (L) 05/01/2018 1109    BILITOT 0.5 05/01/2018 1109            NO vitals  as this was a virtual visit during cvod19 pandemic  Patient has no vital signs for me today    Lab Results   Component Value Date    WBC 4.5 05/01/2018    HGB 12.1 05/01/2018    HCT 36.3 05/01/2018    MCV 93.0 05/01/2018     05/01/2018     Lab Results   Component Value Date    LABA1C 5.4 05/01/2018     Lab Results   Component Value Date    .3 05/01/2018     Lab Results   Component Value Date    LABA1C 5.4 05/01/2018     No components found for: CHLPL  No results found for: TRIG  No results found for: HDL  No results found for: LDLCALC  No results found for: LABVLDL    Old labs and records reviewed or requested  Discussed past lab and studies with patient      Diagnosis Orders   1. Anxiety  escitalopram (LEXAPRO) 20 MG tablet   2. Seasonal allergic rhinitis, unspecified trigger     3. Hx of skin cancer, basal cell     4. Benign paroxysmal positional vertigo, unspecified laterality         Anxiety doing well with her Lexapro. 20 mg will continue. Some insomnia. Information on insomnia.   If she would like medication for this she will let me know. Allergic rhinitis. Not an issue today. History of basal cell carcinoma following with dermatology. Benign positional vertigo. Not an issue today. No follow-ups on file. Diagnosis and treatment discussed.   Possible side effects of medication reviewed  Patients questions answered  Follow up understood  Pt aware if they are not contacted about any test results , this does not mean they are normal.  They should call

## 2021-05-13 ENCOUNTER — TELEMEDICINE (OUTPATIENT)
Dept: PSYCHOLOGY | Age: 41
End: 2021-05-13
Payer: COMMERCIAL

## 2021-05-13 DIAGNOSIS — F41.1 GENERALIZED ANXIETY DISORDER: Primary | ICD-10-CM

## 2021-05-13 DIAGNOSIS — F41.0 PANIC DISORDER: ICD-10-CM

## 2021-05-13 PROCEDURE — 90791 PSYCH DIAGNOSTIC EVALUATION: CPT | Performed by: PSYCHOLOGIST

## 2021-05-19 ENCOUNTER — TELEPHONE (OUTPATIENT)
Dept: PSYCHOLOGY | Age: 41
End: 2021-05-19

## 2021-05-19 DIAGNOSIS — F41.0 PANIC DISORDER: ICD-10-CM

## 2021-05-19 DIAGNOSIS — F41.1 GENERALIZED ANXIETY DISORDER: Primary | ICD-10-CM

## 2021-05-19 NOTE — TELEPHONE ENCOUNTER
Telephone contact: 5 minutes    Left message with individual psychotherapy referrals:   1) [address]  [address], [address] [address]    2) Corrina Ivey, [address] [facility], [phone] (east side) tele health only     Pt welcome to call me with any questions about referrals above.

## 2021-09-07 DIAGNOSIS — F41.9 ANXIETY: ICD-10-CM

## 2021-09-07 NOTE — TELEPHONE ENCOUNTER
Request lexapro last filled 10/29/20                          Last ov 10/29/20 VV                                          Next ov not scheduled

## 2021-09-08 RX ORDER — ESCITALOPRAM OXALATE 20 MG/1
TABLET ORAL
Qty: 90 TABLET | Refills: 1 | Status: SHIPPED | OUTPATIENT
Start: 2021-09-08 | End: 2022-03-12 | Stop reason: SDUPTHER

## 2021-12-08 NOTE — PROGRESS NOTES
Behavioral Health Consultation  Doni Vuong PsyD  Psychologist  5/13/2021  4:29 PM    NOTE - this visit conducted via audiovisual means : MyChart, Doxy, etc, in accordance with CMS guidelines. During WINVT-86 public health emergency. Visit initiated at patient or caregiver request with permission to bill to insurer granted. Telemedicine APA guidelines were reviewed and discussed. Patient gave verbal consent for teleservices and will sign a consent form when feasible. Location of provider: [facility]   Location of patient: home    Time spent with Patient: 30 minutes  This is patient's first  [facility] appointment. Reason for Consult:  Panic disorder; ISAURA  Referring Provider: Karlos Cabral MD  [address]    Feedback given to PCP. S:    Last appt: 8/30/2018. Stressor: daughter, Rosi Tucker who is 6years old has OCD. [facility]. With COVID and supporting daughter's medical needs pt is worn out. This sparked deeper discussion about need and readiness for her own anxiety treatment. Discussed benefits of either individual and/or couple therapy. Considered couple therapy not in terms of needing to address marital issues but to provide space for pt to practice asking for emotional support and assertive communication. Shared that she is unsure how her 's understands mental health conditions and that some of stress is around how pt and  parent daughter with OCD. After weighing pros and cons, agreed that starting with individual psychotherapy best for now as adjunct to her psychiatric medication. Would like to try \"full dose\" psychotherapy before consult back to PCP about medication change and/or adjustment. Really emphasized that psychotherapy is the medicine here with ISAURA and panic issues. She is in full agreement with this plan.  I will reach out to colleagues for referral.    Pt aware that I am leaving Edilberto Tsang, I met with Zeynep today.  She reports that her mood and anxiety have improved significantly with the addition of buspirone.  She has been struggling with multiple urinary tract infections, as you probably already know.  She is planning to return to [place] on a permanent basis as soon as possible.  In the meantime, she reports that she is doing well enough that she can be returned to your care.  Please feel free to contact me with questions or concerns.  Thank you for the opportunity to be involved in the care of this patient.    Regards,  Nubia Alcaraz MD  [facility]aborative Care Psychiatry  [facility]       office [address] but that [place] [employer] [facility] will be here to support her as back if needed as well. Psych med: Lexapro 20 mg     O:  MSE:    Appearance    alert, cooperative  Appetite normal  Sleep disturbance Yes  Fatigue Yes  Loss of pleasure Yes  Impulsive behavior No  Speech    normal rate, normal volume and well articulated  Mood    Anxious  Affect    normal affect  Thought Content    intact  Thought Process    linear, goal directed and coherent  Associations    logical connections  Insight    Good  Judgment    Intact  Orientation    oriented to person, place, time, and general circumstances  Memory    recent and remote memory intact  Attention/Concentration    intact  Morbid ideation No  Suicide Assessment    no suicidal ideation      History:    Medications:   Current Outpatient Medications   Medication Sig Dispense Refill    escitalopram (LEXAPRO) 20 MG tablet TAKE 1 TABLET BY MOUTH EVERY DAY 90 tablet 1    terbinafine (ATHLETES FOOT) 1 % cream Apply topically 2 times daily. (Patient not taking: Reported on 3/5/2020) 42 g 0    oxymetazoline (12 HOUR NASAL SPRAY) 0.05 % nasal spray 1 puff per nostril bid x 5 dyas (Patient not taking: Reported on 10/29/2020) 1 Bottle 3    ciclopirox (PENLAC) 8 % solution Apply topically nightly. (Patient not taking: Reported on 9/12/2019) 6 mL 1    cetirizine (ZYRTEC) 10 MG tablet TAKE 1 TABLET BY MOUTH EVERY DAY FOR ALLERGIES. (Patient not taking: Reported on 9/12/2019) 90 tablet 0    fluticasone (FLONASE) 50 MCG/ACT nasal spray 1 spray by Nasal route daily      Prenatal Vit-Fe Fumarate-FA (PRENATAL PO) Take by mouth       No current facility-administered medications for this visit.         Social History:   Social History     Socioeconomic History    Marital status:      Spouse name: Not on file    Number of children: Not on file    Years of education: Not on file    Highest education level: Not on file   Occupational History    Not on file   Social Needs    Financial resource strain: Not on file    Food insecurity     Worry: Not on file     Inability: Not on file    Transportation needs     Medical: Not on file     Non-medical: Not on file   Tobacco Use    Smoking status: Never Smoker    Smokeless tobacco: Never Used   Substance and Sexual Activity    Alcohol use: No    Drug use: Not on file    Sexual activity: Yes   Lifestyle    Physical activity     Days per week: Not on file     Minutes per session: Not on file    Stress: Not on file   Relationships    Social connections     Talks on phone: Not on file     Gets together: Not on file     Attends Synagogue service: Not on file     Active member of club or organization: Not on file     Attends meetings of clubs or organizations: Not on file     Relationship status: Not on file    Intimate partner violence     Fear of current or ex partner: Not on file     Emotionally abused: Not on file     Physically abused: Not on file     Forced sexual activity: Not on file   Other Topics Concern    Not on file   Social History Narrative    Not on file       TOBACCO:   reports that she has never smoked. She has never used smokeless tobacco.  ETOH:   reports no history of alcohol use.     Family History:   Family History   Problem Relation Age of Onset    Alcohol Abuse Other         anxiety depression    Colon Cancer Neg Hx         breast nor ovarian cancer         A:    See S: above    PHQ Scores 3/5/2020 5/9/2019 8/30/2018 7/26/2018 5/1/2018   PHQ2 Score 0 0 1 2 0   PHQ9 Score 0 0 1 2 0     Interpretation of Total Score Depression Severity: 1-4 = Minimal depression, 5-9 = Mild depression, 10-14 = Moderate depression, 15-19 = Moderately severe depression, 20-27 = Severe depression    Safety: denied any si/hi risk      Diagnosis:    ISAURA, panic disorder       Diagnosis Date    Anxiety     Atypical nevus 3/5/2020    3.2020 a3d4 Daughter dxd OCD/homeschool    BPPV (benign paroxysmal positional vertigo) 4/30/2018    FH:

## 2022-03-11 DIAGNOSIS — F41.9 ANXIETY: ICD-10-CM

## 2022-03-11 RX ORDER — ESCITALOPRAM OXALATE 20 MG/1
TABLET ORAL
Qty: 90 TABLET | Refills: 1 | OUTPATIENT
Start: 2022-03-11

## 2022-03-11 NOTE — TELEPHONE ENCOUNTER
Pt returning call. Notified of message below. States she is still taking the requested medication.  She has scheduled an appt 3/29/22

## 2022-03-12 DIAGNOSIS — F41.9 ANXIETY: ICD-10-CM

## 2022-03-12 RX ORDER — ESCITALOPRAM OXALATE 20 MG/1
TABLET ORAL
Qty: 30 TABLET | Refills: 0 | Status: SHIPPED | OUTPATIENT
Start: 2022-03-12 | End: 2022-04-07

## 2022-03-29 ENCOUNTER — OFFICE VISIT (OUTPATIENT)
Dept: FAMILY MEDICINE CLINIC | Age: 42
End: 2022-03-29
Payer: COMMERCIAL

## 2022-03-29 VITALS
WEIGHT: 141 LBS | OXYGEN SATURATION: 97 % | DIASTOLIC BLOOD PRESSURE: 67 MMHG | HEART RATE: 78 BPM | HEIGHT: 65 IN | BODY MASS INDEX: 23.49 KG/M2 | SYSTOLIC BLOOD PRESSURE: 97 MMHG | RESPIRATION RATE: 12 BRPM

## 2022-03-29 DIAGNOSIS — F41.9 ANXIETY: Primary | ICD-10-CM

## 2022-03-29 DIAGNOSIS — J30.2 SEASONAL ALLERGIC RHINITIS, UNSPECIFIED TRIGGER: ICD-10-CM

## 2022-03-29 DIAGNOSIS — Z85.828 HX OF SKIN CANCER, BASAL CELL: ICD-10-CM

## 2022-03-29 DIAGNOSIS — Z80.3 FH: BREAST CANCER: ICD-10-CM

## 2022-03-29 PROCEDURE — G8420 CALC BMI NORM PARAMETERS: HCPCS | Performed by: INTERNAL MEDICINE

## 2022-03-29 PROCEDURE — 4004F PT TOBACCO SCREEN RCVD TLK: CPT | Performed by: INTERNAL MEDICINE

## 2022-03-29 PROCEDURE — G8484 FLU IMMUNIZE NO ADMIN: HCPCS | Performed by: INTERNAL MEDICINE

## 2022-03-29 PROCEDURE — G8427 DOCREV CUR MEDS BY ELIG CLIN: HCPCS | Performed by: INTERNAL MEDICINE

## 2022-03-29 PROCEDURE — 99214 OFFICE O/P EST MOD 30 MIN: CPT | Performed by: INTERNAL MEDICINE

## 2022-03-29 RX ORDER — BUSPIRONE HYDROCHLORIDE 5 MG/1
TABLET ORAL
Qty: 120 TABLET | Refills: 0 | Status: SHIPPED | OUTPATIENT
Start: 2022-03-29 | End: 2022-04-25

## 2022-03-29 SDOH — ECONOMIC STABILITY: FOOD INSECURITY: WITHIN THE PAST 12 MONTHS, YOU WORRIED THAT YOUR FOOD WOULD RUN OUT BEFORE YOU GOT MONEY TO BUY MORE.: NEVER TRUE

## 2022-03-29 SDOH — ECONOMIC STABILITY: FOOD INSECURITY: WITHIN THE PAST 12 MONTHS, THE FOOD YOU BOUGHT JUST DIDN'T LAST AND YOU DIDN'T HAVE MONEY TO GET MORE.: NEVER TRUE

## 2022-03-29 ASSESSMENT — SOCIAL DETERMINANTS OF HEALTH (SDOH): HOW HARD IS IT FOR YOU TO PAY FOR THE VERY BASICS LIKE FOOD, HOUSING, MEDICAL CARE, AND HEATING?: NOT HARD AT ALL

## 2022-03-29 ASSESSMENT — PATIENT HEALTH QUESTIONNAIRE - PHQ9
SUM OF ALL RESPONSES TO PHQ QUESTIONS 1-9: 2
1. LITTLE INTEREST OR PLEASURE IN DOING THINGS: 1
SUM OF ALL RESPONSES TO PHQ QUESTIONS 1-9: 2
2. FEELING DOWN, DEPRESSED OR HOPELESS: 1
SUM OF ALL RESPONSES TO PHQ9 QUESTIONS 1 & 2: 2

## 2022-03-29 NOTE — PATIENT INSTRUCTIONS
frederick figueredo for therapy. Patient Education        Learning About How to [place] When Things Feel Out of Control  What are some things you can do? When life feels chaotic or overwhelming, it can be easy to get stuck in a cycle of stress and worry. But there are things you can do to cope with worry andfind some calm. Here are some tips.  Acknowledge your feelings. Try to recognize what you're feeling when you're feeling it, without judging itas \"good\" or \"bad. \" It might help to write down how you're feeling and why.  Pay attention to your mindset. The way you think about things really does affect the way you feel. If you tell yourself that something is too hard or too stressful, it's going to feel that way. But if you tell yourself you can handle something hard, you're more likelyto be able to.  Focus more on what you can control and less on what you can't. Here are some ideas:  ? Make a list of the things that cause you stress. Then decide which things on the list you can take action on and which ones you can't. This can remind you what's in your control and what isn't.  ? Look for sources of stress that you can limit. Then take steps to limit them. That might mean turning off the news, staying away from social media, or even having less contact with certain people. ? Choose to spend time on things that are meaningful to you. For example, you could do projects with your kids, foster an animal, write postcards to friends, or do random acts of kindness for your neighbors. Do things that make you feel good or bring you domitila. ? Find ways to keep your mind off of your worries and fears. It could be a project, a hobby, or even making an effort to call a friend on the phone once a week. Whatever you decide, choose things that are in line with your values.  Be careful about coping strategies that might make things worse. Keeping yourself busy might take your mind off your stress.  But it can also exhaust you or even add stress. A glass of wine or a beer in the evening may help some people relax. But drinking isn't a great way to deal with stress. It can actually make stress and anxiety worse. If you find that stress and anxietyare making it hard to manage daily life, talk to a doctor. Current as of: August 24, 2021               Content Version: 13.2  © 2006-2022 [employer]. Care instructions adapted under license by [facility] ([facility]). If you have questions about a medical condition or this instruction, always ask your healthcare professional. [address] any warranty or liability for your use of this information.

## 2022-03-29 NOTE — PROGRESS NOTES
HPI: Courtney Lopez presents for Lexapro    Issues include BPPV, anxiety, pression, basal cell carcinoma cheek,      BCC cheek 10.2020. Recurrence on back. Routine follow-up yearly. BMIis increased in normal range. Taking dance class weekly. martial arts. Increased sweets with mood difficulty. Dates his issues above she is weight other than with pregnancy. Currently on Lexapro for anxiety and depression. Generalized anxiety treated with Lexapro 20. No other medications in past other than Xanax infrequently. .  Daughter OCD. sxs worsening 6 months ago. Family changes. Sold home and bought parents home. Parents in [place] now. Daughter ocd who has started medication. Transitioning children to traditional school not home school the fall. . Concerned with covid. Limits NPR to 10 minutes a day.   takes children and writes. Feeling overwhelmed. Sleep is ok. Sibling addicts. No therapist currently. Does exercise. No suicidal thought. No significant alcohol or recreational drugs. .          no abnormal pap.  Pap 2018. . Gestational diabetes is to follow-up with GYN.     BPPV. MRI negative. No issues currently. Is aware of exercises. No recent flares. Rhinitis treated with Claritin and Flonase as needed      PMH     x 2 gestational diabetes.     MEDS: pnv Lexapro ,     SH:  2 children. ( 8, 15 yo)  1 with anxiety and ocd . No tobacco.  Rare glass of wine. No drugs. No domestic violence. 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     FH: 4 siblings    - breast ovarian, colon cancer, DM     + alcoholism, anxiety and depression, sister 48 with breast cancer. Daughter with ocd. dad prostate cancer .      ROS: Up-to-date eye exams. No hearing loss. Recurrent vertigo. Dyspepsia resolved. Agustin Awkward History of bronchitis remotely. + rhinitis. Seasonal allergies doing well as needed H1 blocker and Flonase.   Stable ventricular ectopy. Had Holter and EKG with echocardiogram without abnormality. Lizzeth Latch Positive constipation during her menses. No recurrent bladder infections. No joint complaints. Skin basal cell carcinoma     Constitutional, ent, CV, respiratory, GI, , joint, skin, allergic and psychiatric ROS reviewed and negative except for above    No Known Allergies    Outpatient Medications Marked as Taking for the 3/29/22 encounter (Office Visit) with Estefani Ramírez MD   Medication Sig Dispense Refill    Loratadine (CLARITIN PO) Take by mouth      escitalopram (LEXAPRO) 20 MG tablet 1 po q day for mood 30 tablet 0    ciclopirox (PENLAC) 8 % solution Apply topically nightly. 6 mL 1             Past Medical History:   Diagnosis Date    Anxiety     Atypical nevus 3/5/2020    3.2020 a3d4 Daughter dxd OCD/homeschool    BPPV (benign paroxysmal positional vertigo) 4/30/2018    FH: breast cancer 3/5/2020    GERD (gastroesophageal reflux disease)     Gestational diabetes     Hx of skin cancer, basal cell 10/29/2020    Seasonal allergic rhinitis 5/9/2019       Past Surgical History:   Procedure Laterality Date    WISDOM TOOTH EXTRACTION               Family History   Problem Relation Age of Onset    Alcohol Abuse Other         anxiety depression    Colon Cancer Neg Hx         breast nor ovarian cancer         Objective     BP 97/67   Pulse 78   Resp 12   Ht 5' 5\" (1.651 m)   Wt 141 lb (64 kg)   SpO2 97% Comment: RA  BMI 23.46 kg/m²     @LASTSAO2(3)@    Wt Readings from Last 3 Encounters:   03/05/20 133 lb (60.3 kg)   09/12/19 127 lb (57.6 kg)   05/09/19 126 lb (57.2 kg)       Physical Exam     NAD alert and cooperative  HEENT: Good dentition. No proptosis. No anterior cervical adenopathy or thyromegaly. Lungs are clear no wheezes rales or rhonchi  Cardiovascular exam no ectopy or murmur today. Abdomen is benign no hepatosplenomegaly epigastric tenderness or mass. Tremor. She is appropriate well-groomed. Deliberate. Chemistry        Component Value Date/Time     05/01/2018 1109    K 4.1 05/01/2018 1109     05/01/2018 1109    CO2 25 05/01/2018 1109    BUN 12 05/01/2018 1109    CREATININE 0.6 05/01/2018 1109        Component Value Date/Time    CALCIUM 9.2 05/01/2018 1109    ALKPHOS 29 (L) 05/01/2018 1109    AST 10 (L) 05/01/2018 1109    ALT 7 (L) 05/01/2018 1109    BILITOT 0.5 05/01/2018 1109            Lab Results   Component Value Date    WBC 4.5 05/01/2018    HGB 12.1 05/01/2018    HCT 36.3 05/01/2018    MCV 93.0 05/01/2018     05/01/2018     Lab Results   Component Value Date    LABA1C 5.4 05/01/2018     Lab Results   Component Value Date    .3 05/01/2018     Lab Results   Component Value Date    LABA1C 5.4 05/01/2018     No components found for: CHLPL  No results found for: TRIG  No results found for: HDL  No results found for: LDLCALC  No results found for: LABVLDL    Old labs and records reviewed or requested  Discussed past lab and studies with patient      Diagnosis Orders   1. Anxiety     2. FH: breast cancer     3. Hx of skin cancer, basal cell     4. Seasonal allergic rhinitis, unspecified trigger       Discussed possibility of switch to Effexor or addition of Wellbutrin for decreased libido or BuSpar as a as needed order daily basis. She is reluctant to discontinue Lexapro or switch over. Interested in [facility]. Side effects and benefits discussed. Urged to follow-up licensed clinical  for counseling. Continue with exercise. Follow-up 3 weeks. History of skin cancer. Continue on her routine screening. Sunscreen. Allergic rhinitis not an issue currently. Has as needed medications. Tinea nail.   Using Penlac per dermatology    Due to routine health maintenance she will call for her Pap smear  On this date I have spent 40 minutes reviewing previous notes, test results, and face to face with the patient discussing the diagnosis and plan          Return in about 3 weeks (around 4/19/2022) for me or frederick on [employer] video ok. Diagnosis and treatment discussed.   Possible side effects of medication reviewed  Patients questions answered  Follow up understood  Pt aware if they are not contacted about any test results , this does not mean they are normal.  They should call

## 2022-04-07 DIAGNOSIS — F41.9 ANXIETY: ICD-10-CM

## 2022-04-07 RX ORDER — ESCITALOPRAM OXALATE 20 MG/1
TABLET ORAL
Qty: 30 TABLET | Refills: 0 | Status: SHIPPED | OUTPATIENT
Start: 2022-04-07 | End: 2022-04-18

## 2022-04-07 RX ORDER — ESCITALOPRAM OXALATE 20 MG/1
TABLET ORAL
Qty: 90 TABLET | Refills: 1 | OUTPATIENT
Start: 2022-04-07

## 2022-04-07 NOTE — TELEPHONE ENCOUNTER
Medication:   Requested Prescriptions     Pending Prescriptions Disp Refills    escitalopram (LEXAPRO) 20 MG tablet [Pharmacy Med Name: ESCITALOPRAM 20 MG TABLET] 30 tablet 0     Sig: TAKE 1 TABLET EVERY DAY FOR MOOD       Last Filled:      Patient Phone Number: [phone] (home)     Last appt: 3/29/2022   Next appt: Visit date not found

## 2022-04-13 ENCOUNTER — TELEPHONE (OUTPATIENT)
Dept: FAMILY MEDICINE CLINIC | Age: 42
End: 2022-04-13

## 2022-04-13 NOTE — TELEPHONE ENCOUNTER
Spoke with patient, she says she is feeling better today and will hold off. If not feeling better in the next few days will call to schedule.

## 2022-04-13 NOTE — TELEPHONE ENCOUNTER
Cough x 8 days, scratchy throat, fatigue. Did at home covid test about 6 days ago, negative. Requesting an appointment, no availability. Please advise.  Pt is reachable at [phone]

## 2022-04-15 DIAGNOSIS — F41.9 ANXIETY: ICD-10-CM

## 2022-04-15 NOTE — TELEPHONE ENCOUNTER
Medication:   Requested Prescriptions     Pending Prescriptions Disp Refills    escitalopram (LEXAPRO) 20 MG tablet [Pharmacy Med Name: ESCITALOPRAM 20 MG TABLET] 90 tablet 1     Sig: TAKE 1 TABLET BY MOUTH EVERY DAY       Last Filled:      Patient Phone Number: [phone] (home)     Last appt: 3/29/2022   Next appt: Visit date not found

## 2022-04-18 RX ORDER — ESCITALOPRAM OXALATE 20 MG/1
TABLET ORAL
Qty: 90 TABLET | Refills: 1 | Status: SHIPPED | OUTPATIENT
Start: 2022-04-18 | End: 2022-05-11

## 2022-04-25 RX ORDER — BUSPIRONE HYDROCHLORIDE 5 MG/1
TABLET ORAL
Qty: 120 TABLET | Refills: 0 | Status: SHIPPED | OUTPATIENT
Start: 2022-04-25 | End: 2022-06-01

## 2022-05-07 DIAGNOSIS — F41.9 ANXIETY: ICD-10-CM

## 2022-05-11 RX ORDER — ESCITALOPRAM OXALATE 20 MG/1
TABLET ORAL
Qty: 30 TABLET | Refills: 5 | Status: SHIPPED | OUTPATIENT
Start: 2022-05-11 | End: 2022-09-07 | Stop reason: SDUPTHER

## 2022-06-01 RX ORDER — BUSPIRONE HYDROCHLORIDE 5 MG/1
TABLET ORAL
Qty: 120 TABLET | Refills: 0 | Status: SHIPPED | OUTPATIENT
Start: 2022-06-01 | End: 2022-09-07 | Stop reason: SDUPTHER

## 2022-09-06 NOTE — PROGRESS NOTES
HPI: Som De La Rosa presents for follow-up    Issues include BPPV, anxiety and depression, basal cell carcinomas      BCC cheek 10.2020. Recurrence on back. Routine follow-up yearly. BMI 23. History of weight concerns. No knee pain. Rare GE reflux. No apnea. Does martial arts for exercise. Generalized anxiety depression treated with Lexapro. Does not want to change medicine. Daughter OCD. Children out of home schooling to public school this year. Recent moved. They bought her parents home when parents moved to [place]. Vertigo impairs some activities. Feels like she is doing better. No has good neighbors. Does not want to change medication at this time. Does not have a therapist.          Jongia Taisha no abnormal pap. Pap 2018. Gricelda Rangel Gestational diabetes i overdue GYN follow-up. Positive family history of breast cancer in sister. Never had a mammogram does her breast exams. BPPV. MRI negative. No issues currently. Is aware of exercises. Flare     Rhinitis treated with Claritin and Flonase as needed      PMH     x 2 gestational diabetes. MEDS: Lexapro ,     SH:  2 children. 8015year-old)  1 with anxiety and ocd . No tobacco.  Rare glass of wine. No drugs. No domestic violence. 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 Daughter with ocd. dad prostate cancer . ROS: Up-to-date eye exams. No hearing loss. Recurrent vertigo. Remittent dyspepsia. History of bronchitis remotely. + rhinitis. Seasonal allergies doing well as needed H1 blocker and Flonase. Stable  ventricular ectopy. Had Holter and EKG with echocardiogram without abnormality. Gricelda Rangel Positive constipation during her menses. No recurrent bladder infections. No joint complaints.   Skin basal cell carcinoma    Constitutional, ent, CV, respiratory, GI, , joint, skin, allergic and psychiatric ROS reviewed and negative except for above    No Known Allergies    Outpatient Medications Marked as Taking for the 9/7/22 encounter (Office Visit) with Ninfa Bauer MD   Medication Sig Dispense Refill    fluticasone (FLONASE) 50 MCG/ACT nasal spray 1 spray by Each Nostril route daily      escitalopram (LEXAPRO) 20 MG tablet TAKE 1 TABLET EVERY DAY FOR MOOD 90 tablet 1    busPIRone (BUSPAR) 5 MG tablet TAKE 1-2 PILLS UP TO TWICE A DAY FOR ANXIETY AND DEPRESSION 120 tablet 0    ciclopirox (PENLAC) 8 % solution Apply topically nightly. 6 mL 1             Past Medical History:   Diagnosis Date    Anxiety     Atypical nevus 3/5/2020    3.2020 a3d4 Daughter dxd OCD/homeschool    BPPV (benign paroxysmal positional vertigo) 4/30/2018    FH: breast cancer 3/5/2020    GERD (gastroesophageal reflux disease)     Gestational diabetes     Hx of skin cancer, basal cell 10/29/2020    Seasonal allergic rhinitis 5/9/2019       Past Surgical History:   Procedure Laterality Date    WISDOM TOOTH EXTRACTION               Family History   Problem Relation Age of Onset    Alcohol Abuse Other         anxiety depression    Colon Cancer Neg Hx         breast nor ovarian cancer         Objective     BP 94/65 (Site: Left Upper Arm, Position: Standing)   Pulse 69   Resp 12   Ht 5' 5\" (1.651 m)   Wt 141 lb (64 kg)   SpO2 99%   BMI 23.46 kg/m²     @LASTSAO2(3)@    Wt Readings from Last 3 Encounters:   03/29/22 141 lb (64 kg)   03/05/20 133 lb (60.3 kg)   09/12/19 127 lb (57.6 kg)       Physical Exam     NAD alert and cooperative  HEENT: Good dentition. Throat is clear. No parotid enlargement TMs unremarkable pink conjunctive a. No anterior cervical adenopathy  Lungs are clear no wheezes rales or rhonchi. Cardiovascular exam regular rate and rhythm without any murmur click. Breast without any masses discharge or dimpling.   Abdomen is benign. No hepatosplenomegaly epigastric tenderness or mass. Good range of motion of the hips. No crepitus knees. Good pulses lower extremities. Sensation is intact. No suspicious skin lesions or nodules. Chemistry        Component Value Date/Time     05/01/2018 1109    K 4.1 05/01/2018 1109     05/01/2018 1109    CO2 25 05/01/2018 1109    BUN 12 05/01/2018 1109    CREATININE 0.6 05/01/2018 1109        Component Value Date/Time    CALCIUM 9.2 05/01/2018 1109    ALKPHOS 29 (L) 05/01/2018 1109    AST 10 (L) 05/01/2018 1109    ALT 7 (L) 05/01/2018 1109    BILITOT 0.5 05/01/2018 1109            Lab Results   Component Value Date    WBC 4.5 05/01/2018    HGB 12.1 05/01/2018    HCT 36.3 05/01/2018    MCV 93.0 05/01/2018     05/01/2018     Lab Results   Component Value Date    LABA1C 5.4 05/01/2018     Lab Results   Component Value Date    .3 05/01/2018     Lab Results   Component Value Date    LABA1C 5.4 05/01/2018     No components found for: CHLPL  No results found for: TRIG  No results found for: HDL  No results found for: LDLCALC  No results found for: LABVLDL    Old labs and records reviewed or requested  Discussed past lab and studies with patient      Diagnosis Orders   1. Anxiety  escitalopram (LEXAPRO) 20 MG tablet      2. Need for influenza vaccination  Influenza, FLUCELVAX, (age 10 mo+), IM, Preservative Free, 0.5 mL      3. FH: breast cancer        4. Medication refill        5. Benign paroxysmal positional vertigo, unspecified laterality        6. Seasonal allergic rhinitis, unspecified trigger  Comprehensive Metabolic Panel      7. Hx of skin cancer, basal cell        8. Screening, lipid  Lipid Panel      9. Encounter for HCV screening test for low risk patient  Hepatitis C Antibody      10. History of gestational diabetes  Comprehensive Metabolic Panel        Marco depression fair control no change in medication was offered counseling. Continue exercise.     Family history of breast cancer mammogram disease done in December 2022. She will get this through her GYN. Medication refill this was performed. Be PPV. Continue on with her exercises not interested in seeing ENT again at this time. History of allergic rhinitis continue Flonase. History basal cell carcinoma-yearly Derm follow-up. Unknown lipid. Lipid and hepatitis C drawn. History of gestational diabetes CMP. General health maintenance she had her flu vaccine. We will be calling for a GYN exam and mammogram in the future  On this date 38 I have spent minutes reviewing previous notes, test results, and face to face with the patient discussing the diagnosis and plan          No follow-ups on file. Diagnosis and treatment discussed.   Possible side effects of medication reviewed  Patients questions answered  Follow up understood  Pt aware if they are not contacted about any test results , this does not mean they are normal.  They should call

## 2022-09-07 ENCOUNTER — OFFICE VISIT (OUTPATIENT)
Dept: FAMILY MEDICINE CLINIC | Age: 42
End: 2022-09-07
Payer: COMMERCIAL

## 2022-09-07 VITALS
RESPIRATION RATE: 12 BRPM | HEIGHT: 65 IN | DIASTOLIC BLOOD PRESSURE: 65 MMHG | BODY MASS INDEX: 23.49 KG/M2 | WEIGHT: 141 LBS | HEART RATE: 69 BPM | OXYGEN SATURATION: 99 % | SYSTOLIC BLOOD PRESSURE: 94 MMHG

## 2022-09-07 DIAGNOSIS — Z23 NEED FOR INFLUENZA VACCINATION: ICD-10-CM

## 2022-09-07 DIAGNOSIS — J30.2 SEASONAL ALLERGIC RHINITIS, UNSPECIFIED TRIGGER: ICD-10-CM

## 2022-09-07 DIAGNOSIS — Z11.59 ENCOUNTER FOR HCV SCREENING TEST FOR LOW RISK PATIENT: ICD-10-CM

## 2022-09-07 DIAGNOSIS — H81.10 BENIGN PAROXYSMAL POSITIONAL VERTIGO, UNSPECIFIED LATERALITY: ICD-10-CM

## 2022-09-07 DIAGNOSIS — Z13.220 SCREENING, LIPID: ICD-10-CM

## 2022-09-07 DIAGNOSIS — Z76.0 MEDICATION REFILL: ICD-10-CM

## 2022-09-07 DIAGNOSIS — Z80.3 FH: BREAST CANCER: ICD-10-CM

## 2022-09-07 DIAGNOSIS — F41.9 ANXIETY: Primary | ICD-10-CM

## 2022-09-07 DIAGNOSIS — Z85.828 HX OF SKIN CANCER, BASAL CELL: ICD-10-CM

## 2022-09-07 DIAGNOSIS — Z86.32 HISTORY OF GESTATIONAL DIABETES: ICD-10-CM

## 2022-09-07 LAB
A/G RATIO: 2.1 (ref 1.1–2.2)
ALBUMIN SERPL-MCNC: 4.5 G/DL (ref 3.4–5)
ALP BLD-CCNC: 43 U/L (ref 40–129)
ALT SERPL-CCNC: 8 U/L (ref 10–40)
ANION GAP SERPL CALCULATED.3IONS-SCNC: 11 MMOL/L (ref 3–16)
AST SERPL-CCNC: 14 U/L (ref 15–37)
BILIRUB SERPL-MCNC: <0.2 MG/DL (ref 0–1)
BUN BLDV-MCNC: 12 MG/DL (ref 7–20)
CALCIUM SERPL-MCNC: 8.6 MG/DL (ref 8.3–10.6)
CHLORIDE BLD-SCNC: 102 MMOL/L (ref 99–110)
CHOLESTEROL, TOTAL: 217 MG/DL (ref 0–199)
CO2: 24 MMOL/L (ref 21–32)
CREAT SERPL-MCNC: 0.8 MG/DL (ref 0.6–1.1)
GFR AFRICAN AMERICAN: >60
GFR NON-AFRICAN AMERICAN: >60
GLUCOSE BLD-MCNC: 86 MG/DL (ref 70–99)
HDLC SERPL-MCNC: 61 MG/DL (ref 40–60)
HEPATITIS C ANTIBODY INTERPRETATION: NORMAL
LDL CHOLESTEROL CALCULATED: 144 MG/DL
POTASSIUM SERPL-SCNC: 4.2 MMOL/L (ref 3.5–5.1)
SODIUM BLD-SCNC: 137 MMOL/L (ref 136–145)
TOTAL PROTEIN: 6.6 G/DL (ref 6.4–8.2)
TRIGL SERPL-MCNC: 61 MG/DL (ref 0–150)
VLDLC SERPL CALC-MCNC: 12 MG/DL

## 2022-09-07 PROCEDURE — G8427 DOCREV CUR MEDS BY ELIG CLIN: HCPCS | Performed by: INTERNAL MEDICINE

## 2022-09-07 PROCEDURE — 36415 COLL VENOUS BLD VENIPUNCTURE: CPT | Performed by: INTERNAL MEDICINE

## 2022-09-07 PROCEDURE — G8420 CALC BMI NORM PARAMETERS: HCPCS | Performed by: INTERNAL MEDICINE

## 2022-09-07 PROCEDURE — 4004F PT TOBACCO SCREEN RCVD TLK: CPT | Performed by: INTERNAL MEDICINE

## 2022-09-07 PROCEDURE — 90674 CCIIV4 VAC NO PRSV 0.5 ML IM: CPT | Performed by: INTERNAL MEDICINE

## 2022-09-07 PROCEDURE — 99214 OFFICE O/P EST MOD 30 MIN: CPT | Performed by: INTERNAL MEDICINE

## 2022-09-07 PROCEDURE — 90471 IMMUNIZATION ADMIN: CPT | Performed by: INTERNAL MEDICINE

## 2022-09-07 RX ORDER — FLUTICASONE PROPIONATE 50 MCG
1 SPRAY, SUSPENSION (ML) NASAL DAILY
COMMUNITY

## 2022-09-07 RX ORDER — ESCITALOPRAM OXALATE 20 MG/1
TABLET ORAL
Qty: 90 TABLET | Refills: 1 | Status: SHIPPED | OUTPATIENT
Start: 2022-09-07

## 2022-09-07 RX ORDER — BUSPIRONE HYDROCHLORIDE 5 MG/1
TABLET ORAL
Qty: 120 TABLET | Refills: 0 | Status: SHIPPED | OUTPATIENT
Start: 2022-09-07 | End: 2022-10-06

## 2022-09-07 ASSESSMENT — PATIENT HEALTH QUESTIONNAIRE - PHQ9
1. LITTLE INTEREST OR PLEASURE IN DOING THINGS: 0
SUM OF ALL RESPONSES TO PHQ9 QUESTIONS 1 & 2: 1
SUM OF ALL RESPONSES TO PHQ QUESTIONS 1-9: 1
2. FEELING DOWN, DEPRESSED OR HOPELESS: 1
SUM OF ALL RESPONSES TO PHQ QUESTIONS 1-9: 1

## 2022-09-07 ASSESSMENT — ANXIETY QUESTIONNAIRES
GAD7 TOTAL SCORE: 6
5. BEING SO RESTLESS THAT IT IS HARD TO SIT STILL: 0
2. NOT BEING ABLE TO STOP OR CONTROL WORRYING: 1
6. BECOMING EASILY ANNOYED OR IRRITABLE: 1
4. TROUBLE RELAXING: 1
7. FEELING AFRAID AS IF SOMETHING AWFUL MIGHT HAPPEN: 0
1. FEELING NERVOUS, ANXIOUS, OR ON EDGE: 2
3. WORRYING TOO MUCH ABOUT DIFFERENT THINGS: 1
IF YOU CHECKED OFF ANY PROBLEMS ON THIS QUESTIONNAIRE, HOW DIFFICULT HAVE THESE PROBLEMS MADE IT FOR YOU TO DO YOUR WORK, TAKE CARE OF THINGS AT HOME, OR GET ALONG WITH OTHER PEOPLE: SOMEWHAT DIFFICULT

## 2022-09-07 NOTE — PATIENT INSTRUCTIONS
Positive follow-up with your gynecologist.  Jalen Edward in December. Continue yearly follow-ups with your dermatologist  Consider following with counselor Diamond Collins in the office. If interested you could schedule an appointment on your way out  I have ordered 6 months of your Lexapro. Please schedule with a new PCP in about 3 months  You could try using Zyrtec or Allegra instead of Claritin since you have been on this for quite some time. Within 20 minutes of exercise daily. I recommend getting a COVID booster if you have not had the second booster    Below are the names of some of the PCPs available in her [facility] system. Please call to schedule with one of them within the next    Thank you for allowing me to be involved in your care.   The best to you and your family    MD Cliff Saldana MD  [facility] internal Medicine  [id number] [facility] [address]  [address] scheduling  [phone]

## 2022-09-07 NOTE — PROGRESS NOTES
Vaccine Information Sheet, \"Influenza - Inactivated\"  given to Reanna Downs, or parent/legal guardian of  Reanna Downs and verbalized understanding. Patient responses:    Have you received any other vaccine within the last 14 days? No  Do you currently have an active infectious or acute respiratory illness or fever? No  Have you ever had a reaction to a flu vaccine? No  Do you have any current illness? No  Have you ever had Guillian Saint Amant Syndrome? No  Do you have a serious allergy to any of the follow: Neomycin, Polymyxin, Thimerosal, eggs or egg products? No      Flu vaccine given per order. Please see immunization tab. Risks and benefits explained. Current VIS given.       Immunizations Administered       Name Date Dose Route    Influenza, FLUCELVAX, (age 10 mo+), MDCK, PF, 0.5mL 9/7/2022 0.5 mL Intramuscular    Site: Deltoid- Right    Lot: [id number]    NDC: 99279-763-37

## 2022-09-08 PROBLEM — E78.2 MIXED HYPERLIPIDEMIA: Status: ACTIVE | Noted: 2022-09-08

## 2022-10-06 RX ORDER — BUSPIRONE HYDROCHLORIDE 5 MG/1
TABLET ORAL
Qty: 120 TABLET | Refills: 0 | Status: SHIPPED | OUTPATIENT
Start: 2022-10-06

## 2022-10-06 NOTE — TELEPHONE ENCOUNTER
Is she doing better with 2 pills twice daily.   Make sure she has a follow up apt with  New provider

## 2023-02-13 ENCOUNTER — OFFICE VISIT (OUTPATIENT)
Dept: FAMILY MEDICINE CLINIC | Age: 43
End: 2023-02-13

## 2023-02-13 VITALS
HEART RATE: 80 BPM | BODY MASS INDEX: 23.49 KG/M2 | OXYGEN SATURATION: 98 % | HEIGHT: 65 IN | RESPIRATION RATE: 20 BRPM | WEIGHT: 141 LBS | DIASTOLIC BLOOD PRESSURE: 60 MMHG | SYSTOLIC BLOOD PRESSURE: 90 MMHG

## 2023-02-13 DIAGNOSIS — F32.A ANXIETY AND DEPRESSION: Primary | ICD-10-CM

## 2023-02-13 DIAGNOSIS — Z13.1 DIABETES MELLITUS SCREENING: ICD-10-CM

## 2023-02-13 DIAGNOSIS — F41.9 ANXIETY AND DEPRESSION: Primary | ICD-10-CM

## 2023-02-13 DIAGNOSIS — F41.9 ANXIETY: ICD-10-CM

## 2023-02-13 DIAGNOSIS — Z13.220 SCREENING, LIPID: ICD-10-CM

## 2023-02-13 LAB
A/G RATIO: 1.7 (ref 1.1–2.2)
ALBUMIN SERPL-MCNC: 4.3 G/DL (ref 3.4–5)
ALP BLD-CCNC: 44 U/L (ref 40–129)
ALT SERPL-CCNC: 7 U/L (ref 10–40)
ANION GAP SERPL CALCULATED.3IONS-SCNC: 11 MMOL/L (ref 3–16)
AST SERPL-CCNC: 14 U/L (ref 15–37)
BILIRUB SERPL-MCNC: <0.2 MG/DL (ref 0–1)
BUN BLDV-MCNC: 16 MG/DL (ref 7–20)
CALCIUM SERPL-MCNC: 9.1 MG/DL (ref 8.3–10.6)
CHLORIDE BLD-SCNC: 103 MMOL/L (ref 99–110)
CHOLESTEROL, FASTING: 181 MG/DL (ref 0–199)
CO2: 25 MMOL/L (ref 21–32)
CREAT SERPL-MCNC: 0.6 MG/DL (ref 0.6–1.1)
GFR SERPL CREATININE-BSD FRML MDRD: >60 ML/MIN/{1.73_M2}
GLUCOSE BLD-MCNC: 91 MG/DL (ref 70–99)
HDLC SERPL-MCNC: 56 MG/DL (ref 40–60)
LDL CHOLESTEROL CALCULATED: 113 MG/DL
POTASSIUM SERPL-SCNC: 4.2 MMOL/L (ref 3.5–5.1)
SODIUM BLD-SCNC: 139 MMOL/L (ref 136–145)
TOTAL PROTEIN: 6.8 G/DL (ref 6.4–8.2)
TRIGLYCERIDE, FASTING: 59 MG/DL (ref 0–150)
VLDLC SERPL CALC-MCNC: 12 MG/DL

## 2023-02-13 RX ORDER — ESCITALOPRAM OXALATE 20 MG/1
TABLET ORAL
Qty: 90 TABLET | Refills: 1 | Status: SHIPPED | OUTPATIENT
Start: 2023-02-13

## 2023-02-13 RX ORDER — BUSPIRONE HYDROCHLORIDE 5 MG/1
TABLET ORAL
Qty: 120 TABLET | Refills: 1 | Status: SHIPPED | OUTPATIENT
Start: 2023-02-13

## 2023-02-13 SDOH — ECONOMIC STABILITY: FOOD INSECURITY: WITHIN THE PAST 12 MONTHS, THE FOOD YOU BOUGHT JUST DIDN'T LAST AND YOU DIDN'T HAVE MONEY TO GET MORE.: NEVER TRUE

## 2023-02-13 SDOH — ECONOMIC STABILITY: HOUSING INSECURITY
IN THE LAST 12 MONTHS, WAS THERE A TIME WHEN YOU DID NOT HAVE A STEADY PLACE TO SLEEP OR SLEPT IN A SHELTER (INCLUDING NOW)?: NO

## 2023-02-13 SDOH — ECONOMIC STABILITY: INCOME INSECURITY: HOW HARD IS IT FOR YOU TO PAY FOR THE VERY BASICS LIKE FOOD, HOUSING, MEDICAL CARE, AND HEATING?: NOT HARD AT ALL

## 2023-02-13 SDOH — HEALTH STABILITY: PHYSICAL HEALTH: ON AVERAGE, HOW MANY DAYS PER WEEK DO YOU ENGAGE IN MODERATE TO STRENUOUS EXERCISE (LIKE A BRISK WALK)?: 4 DAYS

## 2023-02-13 SDOH — HEALTH STABILITY: PHYSICAL HEALTH: ON AVERAGE, HOW MANY MINUTES DO YOU ENGAGE IN EXERCISE AT THIS LEVEL?: 60 MIN

## 2023-02-13 SDOH — ECONOMIC STABILITY: FOOD INSECURITY: WITHIN THE PAST 12 MONTHS, YOU WORRIED THAT YOUR FOOD WOULD RUN OUT BEFORE YOU GOT MONEY TO BUY MORE.: NEVER TRUE

## 2023-02-13 ASSESSMENT — ANXIETY QUESTIONNAIRES
2. NOT BEING ABLE TO STOP OR CONTROL WORRYING: 0
IF YOU CHECKED OFF ANY PROBLEMS ON THIS QUESTIONNAIRE, HOW DIFFICULT HAVE THESE PROBLEMS MADE IT FOR YOU TO DO YOUR WORK, TAKE CARE OF THINGS AT HOME, OR GET ALONG WITH OTHER PEOPLE: NOT DIFFICULT AT ALL
5. BEING SO RESTLESS THAT IT IS HARD TO SIT STILL: 0
6. BECOMING EASILY ANNOYED OR IRRITABLE: 1
3. WORRYING TOO MUCH ABOUT DIFFERENT THINGS: 1
4. TROUBLE RELAXING: 1
GAD7 TOTAL SCORE: 5
7. FEELING AFRAID AS IF SOMETHING AWFUL MIGHT HAPPEN: 1
1. FEELING NERVOUS, ANXIOUS, OR ON EDGE: 1

## 2023-02-13 ASSESSMENT — PATIENT HEALTH QUESTIONNAIRE - PHQ9
SUM OF ALL RESPONSES TO PHQ QUESTIONS 1-9: 0
2. FEELING DOWN, DEPRESSED OR HOPELESS: 0
SUM OF ALL RESPONSES TO PHQ QUESTIONS 1-9: 0
SUM OF ALL RESPONSES TO PHQ9 QUESTIONS 1 & 2: 0
1. LITTLE INTEREST OR PLEASURE IN DOING THINGS: 0
SUM OF ALL RESPONSES TO PHQ QUESTIONS 1-9: 0
SUM OF ALL RESPONSES TO PHQ QUESTIONS 1-9: 0

## 2023-02-13 NOTE — PROGRESS NOTES
Erin Hilliard (:  1980) is a 38 y.o. female,New patient, here for evaluation of the following chief complaint(s):    New Patient (Establish care ) and Other ([facility] , new therapist, Jesus atkins )      SUBJECTIVE/OBJECTIVE:  HPI  New pt to establish care   Anxiety and depression diagnosed in   She has tried talk therapy- meditation lots of other   She is currently on lexapro - buspar For the last 2-3 years and this helps in addition to the other techniques she has learned  The weather   She would like to see a therapist - her sisters are a big trigger they were living with her parents now living with her brother in [address] [place] [address] in the home where they all grow up has caused some issues as well - she is having nightmares just wants to talk  to someone    Review of Systems   Psychiatric/Behavioral:  Negative for dysphoric mood. The patient is not nervous/anxious. Physical Exam  Vitals reviewed. Constitutional:       General: She is awake. She is not in acute distress. Appearance: Normal appearance. She is well-developed, well-groomed and normal weight. She is not ill-appearing, toxic-appearing or diaphoretic. Cardiovascular:      Rate and Rhythm: Normal rate and regular rhythm. Heart sounds: Normal heart sounds, S1 normal and S2 normal. Heart sounds not distant. No murmur heard. No systolic murmur is present. No diastolic murmur is present. Pulmonary:      Effort: Pulmonary effort is normal.      Breath sounds: Normal breath sounds and air entry. Neurological:      Mental Status: She is alert and oriented to person, place, and time. Psychiatric:         Attention and Perception: Attention and perception normal.         Mood and Affect: Mood and affect normal.         Speech: Speech normal.         Behavior: Behavior normal. Behavior is cooperative. Thought Content:  Thought content normal.         Cognition and Memory: Cognition and memory normal. Judgment: Judgment normal.       Winter was seen today for new patient and other. Diagnoses and all orders for this visit:    Anxiety and depression  PHQ-9 Total Score: 0 (2/13/2023  8:55 AM)   -     busPIRone (BUSPAR) 5 MG tablet; TAKE 1-2 TABLETS UP TO TWICE A DAY FOR ANXIETY AND DEPRESSION  escitalopram (LEXAPRO) 20 MG tablet; TAKE 1 TABLET EVERY DAY FOR MOOD  External Referral To Psychology- collaborative wellness  Follow up in six months    Anxiety  ISAURA 7 SCORE 2/13/2023 9/7/2022 8/30/2018 7/26/2018   ISAURA-7 Total Score 5 6 - -   ISAURA-7 Total Score - - 4 3     Interpretation of ISAURA-7 score: 5-9 = mild anxiety, 10-14 = moderate anxiety, 15+ = severe anxiety. Recommend referral to behavioral health for scores 10 or greater.   -     busPIRone (BUSPAR) 5 MG tablet; TAKE 1-2 TABLETS UP TO TWICE A DAY FOR ANXIETY AND DEPRESSION  -     escitalopram (LEXAPRO) 20 MG tablet; TAKE 1 TABLET EVERY DAY FOR MOOD    Screening, lipid  -     Lipid, Fasting; Future      Diabetes mellitus screening  -     Comprehensive Metabolic Panel; Future    Instructed to schedule pap    An electronic signature was used to authenticate this note.     --Harvey Poe, APRGERMAINE - CNP

## 2023-03-08 RX ORDER — BUSPIRONE HYDROCHLORIDE 5 MG/1
TABLET ORAL
Qty: 120 TABLET | Refills: 1 | OUTPATIENT
Start: 2023-03-08

## 2023-03-08 NOTE — TELEPHONE ENCOUNTER
LV 2/13/23 WITH CC FOR ANXIETY AND DEPRESSION NV NONE  busPIRone (BUSPAR) 5 MG tablet 120 tablet 1 2/13/2023     Sig: TAKE 1-2 TABLETS UP TO TWICE A DAY FOR ANXIETY AND DEPRESSION    Sent to pharmacy as: busPIRone HCl 5 MG Oral Tablet (BUSPAR)    E-Prescribing Status: Receipt confirmed by pharmacy (2/13/2023  9:23 AM EST)    REFILL TOO SOON

## 2023-07-25 ASSESSMENT — ENCOUNTER SYMPTOMS: HEARTBURN: 1

## 2023-07-26 ENCOUNTER — OFFICE VISIT (OUTPATIENT)
Dept: FAMILY MEDICINE CLINIC | Age: 43
End: 2023-07-26
Payer: COMMERCIAL

## 2023-07-26 VITALS
OXYGEN SATURATION: 95 % | HEART RATE: 62 BPM | BODY MASS INDEX: 24.16 KG/M2 | WEIGHT: 145 LBS | SYSTOLIC BLOOD PRESSURE: 110 MMHG | HEIGHT: 65 IN | DIASTOLIC BLOOD PRESSURE: 70 MMHG

## 2023-07-26 DIAGNOSIS — I49.3 PVC (PREMATURE VENTRICULAR CONTRACTION): ICD-10-CM

## 2023-07-26 DIAGNOSIS — R12 HEARTBURN SYMPTOM: Primary | ICD-10-CM

## 2023-07-26 PROCEDURE — 93000 ELECTROCARDIOGRAM COMPLETE: CPT | Performed by: NURSE PRACTITIONER

## 2023-07-26 PROCEDURE — G8420 CALC BMI NORM PARAMETERS: HCPCS | Performed by: NURSE PRACTITIONER

## 2023-07-26 PROCEDURE — G8427 DOCREV CUR MEDS BY ELIG CLIN: HCPCS | Performed by: NURSE PRACTITIONER

## 2023-07-26 PROCEDURE — 1036F TOBACCO NON-USER: CPT | Performed by: NURSE PRACTITIONER

## 2023-07-26 PROCEDURE — 99213 OFFICE O/P EST LOW 20 MIN: CPT | Performed by: NURSE PRACTITIONER

## 2023-07-26 ASSESSMENT — ENCOUNTER SYMPTOMS
WHEEZING: 0
ABDOMINAL PAIN: 0
NAUSEA: 0
CHOKING: 0
BELCHING: 0
HOARSE VOICE: 0
SORE THROAT: 0
COUGH: 0

## 2023-08-24 RX ORDER — BUSPIRONE HYDROCHLORIDE 5 MG/1
TABLET ORAL
Qty: 120 TABLET | Refills: 1 | Status: SHIPPED | OUTPATIENT
Start: 2023-08-24

## 2023-09-15 RX ORDER — BUSPIRONE HYDROCHLORIDE 5 MG/1
TABLET ORAL
Qty: 120 TABLET | Refills: 1 | OUTPATIENT
Start: 2023-09-15

## 2023-11-13 RX ORDER — BUSPIRONE HYDROCHLORIDE 5 MG/1
TABLET ORAL
Qty: 120 TABLET | Refills: 1 | Status: SHIPPED | OUTPATIENT
Start: 2023-11-13 | End: 2023-11-14 | Stop reason: SDUPTHER

## 2023-11-14 ENCOUNTER — TELEPHONE (OUTPATIENT)
Dept: FAMILY MEDICINE CLINIC | Age: 43
End: 2023-11-14

## 2023-11-14 RX ORDER — BUSPIRONE HYDROCHLORIDE 5 MG/1
TABLET ORAL
Qty: 360 TABLET | Refills: 0 | Status: SHIPPED | OUTPATIENT
Start: 2023-11-14

## 2023-11-15 DIAGNOSIS — F41.9 ANXIETY: ICD-10-CM

## 2023-11-15 RX ORDER — ESCITALOPRAM OXALATE 20 MG/1
TABLET ORAL
Qty: 30 TABLET | Refills: 5 | Status: SHIPPED | OUTPATIENT
Start: 2023-11-15

## 2024-05-16 DIAGNOSIS — F41.9 ANXIETY: ICD-10-CM

## 2024-05-16 RX ORDER — ESCITALOPRAM OXALATE 20 MG/1
TABLET ORAL
Qty: 30 TABLET | Refills: 5 | OUTPATIENT
Start: 2024-05-16

## 2024-05-16 NOTE — TELEPHONE ENCOUNTER
LV 7/26/23 WITH CHETNA ROBERTSON NV NONE  PLEASE CALL PT TO SCHEDULE APPT FOR ANXIETY AND DEPRESSION THEN WE CAN SEND REFILL

## 2024-05-20 ENCOUNTER — TELEPHONE (OUTPATIENT)
Dept: FAMILY MEDICINE CLINIC | Age: 44
End: 2024-05-20

## 2024-05-20 DIAGNOSIS — F41.9 ANXIETY: ICD-10-CM

## 2024-05-20 RX ORDER — ESCITALOPRAM OXALATE 20 MG/1
TABLET ORAL
Qty: 30 TABLET | Refills: 0 | Status: SHIPPED | OUTPATIENT
Start: 2024-05-20

## 2024-05-20 NOTE — TELEPHONE ENCOUNTER
Patient needs a refill on her medication ESCITALOPRAM 20 MG TABLET - 1 TABLET PER DAY - 30 DAY SUPPLY.    She has appointment on 5/29/24.     Please give her a call back.     [facility] in [facility] - [address]- phone no. [phone]

## 2024-05-29 ENCOUNTER — OFFICE VISIT (OUTPATIENT)
Dept: FAMILY MEDICINE CLINIC | Age: 44
End: 2024-05-29
Payer: COMMERCIAL

## 2024-05-29 VITALS — DIASTOLIC BLOOD PRESSURE: 60 MMHG | BODY MASS INDEX: 24.46 KG/M2 | WEIGHT: 147 LBS | SYSTOLIC BLOOD PRESSURE: 102 MMHG

## 2024-05-29 DIAGNOSIS — F41.9 ANXIETY: ICD-10-CM

## 2024-05-29 PROCEDURE — G8427 DOCREV CUR MEDS BY ELIG CLIN: HCPCS | Performed by: NURSE PRACTITIONER

## 2024-05-29 PROCEDURE — 99213 OFFICE O/P EST LOW 20 MIN: CPT | Performed by: NURSE PRACTITIONER

## 2024-05-29 PROCEDURE — 1036F TOBACCO NON-USER: CPT | Performed by: NURSE PRACTITIONER

## 2024-05-29 PROCEDURE — G8420 CALC BMI NORM PARAMETERS: HCPCS | Performed by: NURSE PRACTITIONER

## 2024-05-29 RX ORDER — BUSPIRONE HYDROCHLORIDE 10 MG/1
10 TABLET ORAL DAILY
Qty: 90 TABLET | Refills: 3 | Status: SHIPPED | OUTPATIENT
Start: 2024-05-29 | End: 2024-08-27

## 2024-05-29 RX ORDER — OMEPRAZOLE 20 MG/1
20 CAPSULE, DELAYED RELEASE ORAL DAILY
COMMUNITY

## 2024-05-29 RX ORDER — ESCITALOPRAM OXALATE 20 MG/1
TABLET ORAL
Qty: 90 TABLET | Refills: 3 | Status: SHIPPED | OUTPATIENT
Start: 2024-05-29

## 2024-05-29 RX ORDER — BUSPIRONE HYDROCHLORIDE 5 MG/1
TABLET ORAL
Qty: 360 TABLET | Refills: 0 | Status: CANCELLED | OUTPATIENT
Start: 2024-05-29

## 2024-05-29 SDOH — ECONOMIC STABILITY: FOOD INSECURITY: WITHIN THE PAST 12 MONTHS, YOU WORRIED THAT YOUR FOOD WOULD RUN OUT BEFORE YOU GOT MONEY TO BUY MORE.: NEVER TRUE

## 2024-05-29 SDOH — ECONOMIC STABILITY: FOOD INSECURITY: WITHIN THE PAST 12 MONTHS, THE FOOD YOU BOUGHT JUST DIDN'T LAST AND YOU DIDN'T HAVE MONEY TO GET MORE.: NEVER TRUE

## 2024-05-29 SDOH — ECONOMIC STABILITY: TRANSPORTATION INSECURITY
IN THE PAST 12 MONTHS, HAS LACK OF TRANSPORTATION KEPT YOU FROM MEETINGS, WORK, OR FROM GETTING THINGS NEEDED FOR DAILY LIVING?: NO

## 2024-05-29 SDOH — ECONOMIC STABILITY: INCOME INSECURITY: HOW HARD IS IT FOR YOU TO PAY FOR THE VERY BASICS LIKE FOOD, HOUSING, MEDICAL CARE, AND HEATING?: NOT HARD AT ALL

## 2024-05-29 ASSESSMENT — PATIENT HEALTH QUESTIONNAIRE - PHQ9
2. FEELING DOWN, DEPRESSED OR HOPELESS: NOT AT ALL
SUM OF ALL RESPONSES TO PHQ QUESTIONS 1-9: 2
SUM OF ALL RESPONSES TO PHQ QUESTIONS 1-9: 2
1. LITTLE INTEREST OR PLEASURE IN DOING THINGS: NOT AT ALL
SUM OF ALL RESPONSES TO PHQ QUESTIONS 1-9: 2
4. FEELING TIRED OR HAVING LITTLE ENERGY: SEVERAL DAYS
8. MOVING OR SPEAKING SO SLOWLY THAT OTHER PEOPLE COULD HAVE NOTICED. OR THE OPPOSITE - BEING SO FIDGETY OR RESTLESS THAT YOU HAVE BEEN MOVING AROUND A LOT MORE THAN USUAL: NOT AT ALL
5. POOR APPETITE OR OVEREATING: NOT AT ALL
SUM OF ALL RESPONSES TO PHQ9 QUESTIONS 1 & 2: 0
4. FEELING TIRED OR HAVING LITTLE ENERGY: SEVERAL DAYS
SUM OF ALL RESPONSES TO PHQ QUESTIONS 1-9: 2
10. IF YOU CHECKED OFF ANY PROBLEMS, HOW DIFFICULT HAVE THESE PROBLEMS MADE IT FOR YOU TO DO YOUR WORK, TAKE CARE OF THINGS AT HOME, OR GET ALONG WITH OTHER PEOPLE: NOT DIFFICULT AT ALL
6. FEELING BAD ABOUT YOURSELF - OR THAT YOU ARE A FAILURE OR HAVE LET YOURSELF OR YOUR FAMILY DOWN: NOT AT ALL
3. TROUBLE FALLING OR STAYING ASLEEP: SEVERAL DAYS
9. THOUGHTS THAT YOU WOULD BE BETTER OFF DEAD, OR OF HURTING YOURSELF: NOT AT ALL
6. FEELING BAD ABOUT YOURSELF - OR THAT YOU ARE A FAILURE OR HAVE LET YOURSELF OR YOUR FAMILY DOWN: NOT AT ALL
1. LITTLE INTEREST OR PLEASURE IN DOING THINGS: NOT AT ALL
SUM OF ALL RESPONSES TO PHQ QUESTIONS 1-9: 2
9. THOUGHTS THAT YOU WOULD BE BETTER OFF DEAD, OR OF HURTING YOURSELF: NOT AT ALL
7. TROUBLE CONCENTRATING ON THINGS, SUCH AS READING THE NEWSPAPER OR WATCHING TELEVISION: NOT AT ALL
5. POOR APPETITE OR OVEREATING: NOT AT ALL
10. IF YOU CHECKED OFF ANY PROBLEMS, HOW DIFFICULT HAVE THESE PROBLEMS MADE IT FOR YOU TO DO YOUR WORK, TAKE CARE OF THINGS AT HOME, OR GET ALONG WITH OTHER PEOPLE: NOT DIFFICULT AT ALL
2. FEELING DOWN, DEPRESSED OR HOPELESS: NOT AT ALL
3. TROUBLE FALLING OR STAYING ASLEEP: SEVERAL DAYS
8. MOVING OR SPEAKING SO SLOWLY THAT OTHER PEOPLE COULD HAVE NOTICED. OR THE OPPOSITE, BEING SO FIGETY OR RESTLESS THAT YOU HAVE BEEN MOVING AROUND A LOT MORE THAN USUAL: NOT AT ALL
7. TROUBLE CONCENTRATING ON THINGS, SUCH AS READING THE NEWSPAPER OR WATCHING TELEVISION: NOT AT ALL

## 2024-05-29 ASSESSMENT — ANXIETY QUESTIONNAIRES
5. BEING SO RESTLESS THAT IT IS HARD TO SIT STILL: NOT AT ALL
1. FEELING NERVOUS, ANXIOUS, OR ON EDGE: SEVERAL DAYS
GAD7 TOTAL SCORE: 5
7. FEELING AFRAID AS IF SOMETHING AWFUL MIGHT HAPPEN: SEVERAL DAYS
6. BECOMING EASILY ANNOYED OR IRRITABLE: SEVERAL DAYS
2. NOT BEING ABLE TO STOP OR CONTROL WORRYING: SEVERAL DAYS
4. TROUBLE RELAXING: NOT AT ALL
IF YOU CHECKED OFF ANY PROBLEMS ON THIS QUESTIONNAIRE, HOW DIFFICULT HAVE THESE PROBLEMS MADE IT FOR YOU TO DO YOUR WORK, TAKE CARE OF THINGS AT HOME, OR GET ALONG WITH OTHER PEOPLE: SOMEWHAT DIFFICULT
3. WORRYING TOO MUCH ABOUT DIFFERENT THINGS: SEVERAL DAYS

## 2024-05-29 NOTE — PATIENT INSTRUCTIONS

## 2024-05-29 NOTE — PROGRESS NOTES
Winter Winters (:  1980) is a 43 y.o. female,Established patient, here for evaluation of the following chief complaint(s):    Anxiety (ANXIETY ROUTINE FOLLOW UP MAMMOGRAM LAST YEAR DOES THIS THROUGH OBGYN.)      SUBJECTIVE/OBJECTIVE:  HPI  ANXIETY ROUTINE FOLLOW UP  HAS A THERAPIST = [facility]  EVERY THREE WEEKS ) LOCATED IN [place]  SHE HAS A STRONG FAMILY HISTORY OF ANXIETY  WINTER STATES SHE IS DOING WELL IN REGARDS TO ANXIETY  TAKES HER LEXAPRO - BUSPAR  2 TABS DAILY HAS NOT HAD ISSUES WITH THE MEDICATION            Review of Systems   Psychiatric/Behavioral:  The patient is nervous/anxious.        Physical Exam  Vitals reviewed.   Constitutional:       General: She is awake. She is not in acute distress.     Appearance: Normal appearance. She is well-developed and well-groomed. She is not ill-appearing, toxic-appearing or diaphoretic.   Cardiovascular:      Rate and Rhythm: Normal rate and regular rhythm.      Heart sounds: Normal heart sounds, S1 normal and S2 normal.   Pulmonary:      Effort: Pulmonary effort is normal.      Breath sounds: Normal breath sounds and air entry.   Neurological:      Mental Status: She is alert and oriented to person, place, and time.   Psychiatric:         Attention and Perception: Attention and perception normal.         Mood and Affect: Mood and affect normal.         Speech: Speech normal.         Behavior: Behavior normal. Behavior is cooperative.         Thought Content: Thought content normal.         Cognition and Memory: Cognition and memory normal.         Judgment: Judgment normal.         Winter was seen today for anxiety.    Diagnoses and all orders for this visit:    Anxiety  STABLE ON CURRENT MEDICATION   CONTINUE THE FOLLOWING MEDICATIONS  -     escitalopram (LEXAPRO) 20 MG tablet; TAKE 1 TABLET EVERY DAY FOR MOOD  -     busPIRone (BUSPAR) 10 MG tablet; Take 1 tablet by mouth daily  FOLLOW UP IN 12 MONTHS FOR PHYSICAL -ANXIETY             An electronic signature

## 2024-06-05 ENCOUNTER — TELEPHONE (OUTPATIENT)
Dept: FAMILY MEDICINE CLINIC | Age: 44
End: 2024-06-05

## 2024-06-05 DIAGNOSIS — Z13.220 SCREENING, LIPID: ICD-10-CM

## 2024-06-05 DIAGNOSIS — Z13.1 DIABETES MELLITUS SCREENING: Primary | ICD-10-CM

## 2024-06-05 NOTE — TELEPHONE ENCOUNTER
Patient has appt. Tomorrow for fasting labs but they are not ordered. Please add orders. Thank you.

## 2024-06-06 ENCOUNTER — NURSE ONLY (OUTPATIENT)
Dept: FAMILY MEDICINE CLINIC | Age: 44
End: 2024-06-06
Payer: COMMERCIAL

## 2024-06-06 DIAGNOSIS — Z13.1 DIABETES MELLITUS SCREENING: ICD-10-CM

## 2024-06-06 DIAGNOSIS — Z13.220 SCREENING, LIPID: ICD-10-CM

## 2024-06-06 LAB
ALBUMIN SERPL-MCNC: 4.5 G/DL (ref 3.4–5)
ALBUMIN/GLOB SERPL: 1.9 {RATIO} (ref 1.1–2.2)
ALP SERPL-CCNC: 40 U/L (ref 40–129)
ALT SERPL-CCNC: 12 U/L (ref 10–40)
ANION GAP SERPL CALCULATED.3IONS-SCNC: 7 MMOL/L (ref 3–16)
AST SERPL-CCNC: 18 U/L (ref 15–37)
BILIRUB SERPL-MCNC: <0.2 MG/DL (ref 0–1)
BUN SERPL-MCNC: 18 MG/DL (ref 7–20)
CALCIUM SERPL-MCNC: 9.5 MG/DL (ref 8.3–10.6)
CHLORIDE SERPL-SCNC: 104 MMOL/L (ref 99–110)
CHOLEST SERPL-MCNC: 215 MG/DL (ref 0–199)
CO2 SERPL-SCNC: 27 MMOL/L (ref 21–32)
CREAT SERPL-MCNC: 0.7 MG/DL (ref 0.6–1.1)
GFR SERPLBLD CREATININE-BSD FMLA CKD-EPI: >90 ML/MIN/{1.73_M2}
GLUCOSE SERPL-MCNC: 106 MG/DL (ref 70–99)
HDLC SERPL-MCNC: 61 MG/DL (ref 40–60)
LDL CHOLESTEROL: 140 MG/DL
POTASSIUM SERPL-SCNC: 4.1 MMOL/L (ref 3.5–5.1)
PROT SERPL-MCNC: 6.9 G/DL (ref 6.4–8.2)
SODIUM SERPL-SCNC: 138 MMOL/L (ref 136–145)
TRIGL SERPL-MCNC: 72 MG/DL (ref 0–150)
VLDLC SERPL CALC-MCNC: 14 MG/DL

## 2024-06-06 PROCEDURE — 36415 COLL VENOUS BLD VENIPUNCTURE: CPT | Performed by: NURSE PRACTITIONER

## 2025-06-12 DIAGNOSIS — F41.9 ANXIETY: ICD-10-CM

## 2025-06-17 RX ORDER — ESCITALOPRAM OXALATE 20 MG/1
TABLET ORAL
Qty: 90 TABLET | Refills: 3 | OUTPATIENT
Start: 2025-06-17

## 2025-08-12 RX ORDER — BUSPIRONE HYDROCHLORIDE 10 MG/1
TABLET ORAL
Qty: 30 TABLET | OUTPATIENT
Start: 2025-08-12